# Patient Record
Sex: MALE | Race: WHITE | Employment: OTHER | URBAN - METROPOLITAN AREA
[De-identification: names, ages, dates, MRNs, and addresses within clinical notes are randomized per-mention and may not be internally consistent; named-entity substitution may affect disease eponyms.]

---

## 2019-01-21 ENCOUNTER — ANCILLARY PROCEDURE (OUTPATIENT)
Dept: GENERAL RADIOLOGY | Facility: CLINIC | Age: 64
End: 2019-01-21
Payer: COMMERCIAL

## 2019-01-21 ENCOUNTER — OFFICE VISIT (OUTPATIENT)
Dept: ORTHOPEDICS | Facility: CLINIC | Age: 64
End: 2019-01-21
Payer: COMMERCIAL

## 2019-01-21 ENCOUNTER — ANCILLARY PROCEDURE (OUTPATIENT)
Dept: ULTRASOUND IMAGING | Facility: CLINIC | Age: 64
End: 2019-01-21
Payer: COMMERCIAL

## 2019-01-21 VITALS — BODY MASS INDEX: 29.33 KG/M2 | RESPIRATION RATE: 16 BRPM | WEIGHT: 198 LBS | HEIGHT: 69 IN

## 2019-01-21 DIAGNOSIS — R10.31 RIGHT GROIN PAIN: ICD-10-CM

## 2019-01-21 DIAGNOSIS — R10.31 RIGHT GROIN PAIN: Primary | ICD-10-CM

## 2019-01-21 RX ORDER — FAMOTIDINE 10 MG
10 TABLET ORAL
COMMUNITY
Start: 2010-07-12

## 2019-01-21 RX ORDER — IBUPROFEN 200 MG
TABLET ORAL
COMMUNITY
Start: 2009-06-09

## 2019-01-21 RX ORDER — DIPHENOXYLATE HYDROCHLORIDE AND ATROPINE SULFATE 2.5; .025 MG/1; MG/1
1 TABLET ORAL
COMMUNITY
Start: 2017-11-10

## 2019-01-21 ASSESSMENT — MIFFLIN-ST. JEOR: SCORE: 1683.5

## 2019-01-21 NOTE — PROGRESS NOTES
NEW PATIENT INTAKE QUESTIONNAIRE  SPORTS & ORTHOPEDIC WALK-IN 1/21/2019    Primary Care Physician: None     Where are the majority of your medical records? [facility]    Reason for Visit:    What part of your body is injured / painful?  right hip    What caused the injury /pain? No inciting event     How long ago did your injury occur or pain begin? 4 weeks    What are your most bothersome symptoms? Pain    How would you characterize your symptom? aching and sharp    What makes your symptoms better? Ice and Ibuprofen    What makes your symptoms worse? Playing sport LE weight lifting, standing     Have you been previously seen for this problem? No    Medical History:    Medical History: None    Have you had surgery on this body part before? None    Medications: None    Allergies: Yes:      Family History of Medical Problems: Dad-heart aortic valve    Previous Surgeries: Yes Tonsils 1960's    Social History:    Occupation: Retired    Handedness: Right    Exercise: 4-5 days/week    Review of Systems:    Have you recently had a a fever, chills, weight loss? No    Do you have any vision problems? No    Do you have any chest pain or edema? No    Do you have any shortness of breath or wheezing?  No    Do you have stomach problems? No    Do you have any numbness or focal weakness? No    Do you have diabetes? No    Do you have problems with bleeding or clotting? No    Do you have an rashes or other skin lesions? No    Communication:    How did you hear about us? Mailing: Postcard    Who else should know about this visit?

## 2019-01-21 NOTE — PROGRESS NOTES
"[facility]  Orthopedics  Andi Maria MD  2019     Name: Srinivasan Lee  MRN: [id number]  Age: 63 year old  : 1955  Referring provider: Referred Self     Chief Complaint: Right Anterior Hip/Groin Pain.     History of Present Illness:   Srinivasan Lee is a 63 year old male who presents today for evaluation of right anterior hip and groin pain beginning approximately four weeks ago without precipitating injury or trauma. The pain is intermittent and can be both sharp and achy in nature. He indicates it is worse with activity, specifically lower extremity weight lifting, as well as with standing. He has been using ice and ibuprofen at home for treatment which offers some relief. He additionally mentions he has noticed a small lump with surrounding swelling in the area of pain, expressing concern for a possible inguinal hernia. He otherwise denies associated numbness or tingling. He has not had any testicular pain or swelling.     Review of Systems:   A 10-point review of systems was obtained and is negative except for as noted in the HPI.     Please see health assessment form for complete ROS that has been reviewed by Andi Maria MD.     Medications:      famotidine (PEPCID) 10 MG tablet, Take 10 mg by mouth, Disp: , Rfl:      ibuprofen (ADVIL/MOTRIN) 200 MG tablet, , Disp: , Rfl:      Multiple Vitamin (MULTI-VITAMINS) TABS, Take 1 tablet by mouth, Disp: , Rfl:     Allergies:  Animal dander.   Mercury.   Prednisone.     Past Medical History:  The patient has no past medical history of diabetes mellitus.      Past Surgical History:  The patient does not have any pertinent past surgical history.     Social History:   Single. Retired. Right hand dominant. Non-smoker. Exercises 4-5 days per week.     Family History:  No past pertinent family history.     Physical Examination:  Resp. rate 16, height 1.753 m (5' 9\"), weight 89.8 kg (198 lb).  General: Normal appearance, in no obvious distress.  Skin: No " ecchymosis, erythema, warmth, induration, or obvious rash.  Neuro: No motor deficit, grossly normal coordination, normal muscle tone.   Musculoskeletal: Right Hip - Symmetric passive hip range of motion. Pain with internal rotation. Strength is symmetric and without pain. Mild fullness to palpation in the inguinal area on the right. No increased bulging with cough.     Imaging:   Radiographs of the right hip - 2 views (01/21/2019)  Mild to moderate degenerative changes of the hip with preserved joint space. Per Radiology.     I have independently reviewed the above imaging studies; the results were discussed with the patient.     Assessment:   63 year old male with right groin pain, patient does have some OA on exam and radiographs which could be contributing to his pain, but some of the historical features of his symptoms and his description of a bulge in the groin is concerning for inguinal hernia.      Plan:    - Ultrasound of the groin; I will contact him with results.   - Follow-up as indicated based on results. Which could include surgical referral for if hernia present. Or treatment options for OA of hip.     Andi Maria MD        Scribe Disclosure:   IEmily, am serving as a scribe to document services personally performed by Andi Maria MD at this visit, based upon the provider's statements to me. All documentation has been reviewed by the aforementioned provider prior to being entered into the official medical record.

## 2019-02-20 ENCOUNTER — MYC MEDICAL ADVICE (OUTPATIENT)
Dept: ORTHOPEDICS | Facility: CLINIC | Age: 64
End: 2019-02-20

## 2020-03-11 ENCOUNTER — HEALTH MAINTENANCE LETTER (OUTPATIENT)
Age: 65
End: 2020-03-11

## 2021-01-03 ENCOUNTER — HEALTH MAINTENANCE LETTER (OUTPATIENT)
Age: 66
End: 2021-01-03

## 2021-03-09 ENCOUNTER — TRANSFERRED RECORDS (OUTPATIENT)
Dept: HEALTH INFORMATION MANAGEMENT | Facility: CLINIC | Age: 66
End: 2021-03-09

## 2021-03-11 ENCOUNTER — TELEPHONE (OUTPATIENT)
Dept: DERMATOLOGY | Facility: CLINIC | Age: 66
End: 2021-03-11

## 2021-03-12 ENCOUNTER — TELEPHONE (OUTPATIENT)
Dept: DERMATOLOGY | Facility: CLINIC | Age: 66
End: 2021-03-12

## 2021-03-13 NOTE — TELEPHONE ENCOUNTER
FUTURE VISIT INFORMATION      FUTURE VISIT INFORMATION:    Date: 4.6.21    Time: 11:00    [place] [facility] [place]  REFERRAL INFORMATION:    Referring provider:  na    Referring providers clinic:  na    Reason for visit/diagnosis  allergies per pt    RECORDS REQUESTED FROM:       Clinic name Comments Records Status Imaging Status    No previous related records

## 2021-04-02 NOTE — PROGRESS NOTES
[facility] Dermato-allergology note      Allergy Problem List:    Specialty Problems     None          CC:   No chief complaint on file.      Encounter Date: Apr 6, 2021    History of Present Illness:  I have reviewed the existing informations with patient personally, in Epic and other sources including the nursing intake corresponding to this issue. Srinivasan Lee is a 65 year old male who presents to the consult  in person     Patient sent by Dr. Hidalgo [facility]  Patient had a treatment with Levulan and red light = Jan 5th 2h treatment on face and lips = for few days red skin  3.5 weeks later patient developed a very itchy rash around the lips and oral cavity mostly  Swelling resolved after about 1 day, but redness stayed for couple weeks with desquamation. Desonide cream improved the eczema and stopped around March 9th. Then rebound and re-used Desonide and since about 2 weeks no desonide and no recurrence    Stopped Desonide about     Used then Aquaphor advanced and straight Aloe  Has started to use an organic toothpaste from [employer]      Past Medical History:   There is no problem list on file for this patient.    No past medical history on file.    Allergy History:     Allergies   Allergen Reactions     Animal Dander Other (See Comments)     Congestion, sneezing     Mercury Other (See Comments)     Used in eye solution---itching eyes, redness     Prednisone      Other reaction(s): Tachycardia  Heart racing       Social History:  The patient is retired. Patient has the following hobbies or non-occupational exposure      reports that he has never smoked. He has never used smokeless tobacco.      Family History:  No family history on file.    Medications:  Current Outpatient Medications   Medication Sig Dispense Refill     famotidine (PEPCID) 10 MG tablet Take 10 mg by mouth       ibuprofen (ADVIL/MOTRIN) 200 MG tablet        Multiple Vitamin (MULTI-VITAMINS) TABS Take 1 tablet by mouth          Review of Systems:  -As per HPI  -Constitutional: The patient denies fatigue, fevers, chills, unintended weight loss, and night sweats.  -HEENT: Patient denies nonhealing oral sores.  -Skin: As above in HPI. No additional skin concerns.    Physical exam:  Vitals: There were no vitals taken for this visit.  GEN: This is a well developed, well-nourished male in no acute distress, in a pleasant mood.    Skin: Focused examination of the skin on face within the consultation was performed.   Perioral still some erythema, but no active dermatitis  As above in HPI. No additional skin concerns.  - upper respiratory tract: currently no obvious Rhinitis  - lungs: no signs for active and present Asthma/Wheezing or coughing  - eyes: currently no active conjunctivits  - GI system/digestion: currently no problems, no bloating or Diarrhoea      Allergy Tests:    Past Allergy Test    Future Allergy Test: 4/2/2021     Order for PATCH TESTS    [] Outpatient  [] Inpatient: [facility]..../ Bed ....      Skin Atopy (atopic dermatitis) [] Yes   [x] No as baby milk allergy  Contact allergies:   [x] Yes   [] No to eye drops in 2009  Urticaria/Angioedema  [] Yes   [x] No  Rhinitis/Sinusitis:   [] Yes   [x] No   [] seasonal [] perennial            Allergic Asthma:   [] Yes   [x] No  Pets :  [] Yes   [] No  Which? RC to cats  Food Allergy:   [] Yes   [x] No  Drug allergies:   [] Yes   [x] No  Hand eczema:   [] Yes   [x] No   Leading hand:   [] R   [] L       [] Ambidextrous                        Reason for tests (suspected allergy): acute dermatitis perioral  Known previous allergies: none    Standardized panels  [x] Standard panel (40 tests)  [x] Preservatives & Antimicrobials (31 tests)  [x] Emulsifiers & Additives (25 tests)   [x] Perfumes/Flavours & Plants (25 tests)  [] Hairdresser panel (12 tests)  [] Rubber Chemicals (22 tests)  [] Plastics (26 tests)  [] Colorants/Dyes/Food additives (20 tests)  [] Metals (implants/dental) (24  tests)  [] Local anaesthetics/NSAIDs (13 tests)  [x] Antibiotics & Antimycotics (14 tests)   [] Corticosteroids (15 tests)   [] Photopatch test (62 tests)   [] others: ...      [x] Patient's own products: organic toothpaste from [employer], Aquaphor,   Eucerin cleanser, [place] lip    DO NOT test if chemical or biological identity is unknown!     always ask from patient the product information and safety sheets (MSDS)   [x] Atopy screen prick test (Atopic predisposition): ...    [] Patient needs consultation with Allergy team (changes of tests may apply)  [x] Tests discussed with Allergy team (can have direct appointment for patch tests)       Impression/Plan:    # suspicion for allergic contact sensitization on lips/oral DDx organic tooth paste or lip balm   Unlikely reaction to the PDT     # atopic predisposition with:    RC to cats        Patient counseling:  Explained patch tests and has to bring then his own products      Follow-up: in Derm-Allergy clinic for patch tests    Referred by: Dr. Rocky Leonard    Thank you for the opportunity be involved in the care of this patient.     Staff: Madeline Duran RN    I spent a total of 33 minutes face to face with Srinivasan Lee during today s office visit. Over 50% of this time was spent counseling the patient and/or coordinating care.  Please see Assessment and Plan for details.

## 2021-04-06 ENCOUNTER — PRE VISIT (OUTPATIENT)
Dept: ALLERGY | Facility: CLINIC | Age: 66
End: 2021-04-06

## 2021-04-06 ENCOUNTER — OFFICE VISIT (OUTPATIENT)
Dept: ALLERGY | Facility: CLINIC | Age: 66
End: 2021-04-06
Payer: COMMERCIAL

## 2021-04-06 DIAGNOSIS — J30.89 SEASONAL AND PERENNIAL ALLERGIC RHINOCONJUNCTIVITIS: ICD-10-CM

## 2021-04-06 DIAGNOSIS — L23.89 ALLERGIC CONTACT DERMATITIS DUE TO OTHER AGENTS: Primary | ICD-10-CM

## 2021-04-06 DIAGNOSIS — H10.10 SEASONAL AND PERENNIAL ALLERGIC RHINOCONJUNCTIVITIS: ICD-10-CM

## 2021-04-06 DIAGNOSIS — J30.2 SEASONAL AND PERENNIAL ALLERGIC RHINOCONJUNCTIVITIS: ICD-10-CM

## 2021-04-06 PROCEDURE — 99203 OFFICE O/P NEW LOW 30 MIN: CPT | Performed by: DERMATOLOGY

## 2021-04-06 RX ORDER — EPINEPHRINE 0.3 MG/.3ML
INJECTION SUBCUTANEOUS
COMMUNITY
Start: 2021-03-09

## 2021-04-06 RX ORDER — DESONIDE 0.5 MG/G
CREAM TOPICAL
COMMUNITY
Start: 2021-02-22

## 2021-04-06 NOTE — LETTER
4/6/2021         RE: Srinivasan Lee  [address]  [place] [address]        Dear Colleague,    Thank you for referring your patient, Srinivasan Lee, to the [facility] [place]. Please see a copy of my visit note below.    [facility] Dermato-allergology note      Allergy Problem List:    Specialty Problems     None          CC:   No chief complaint on file.      Encounter Date: Apr 6, 2021    History of Present Illness:  I have reviewed the existing informations with patient personally, in Epic and other sources including the nursing intake corresponding to this issue. Srinivasan Lee is a 65 year old male who presents to the consult  in person     Patient sent by Dr. Hidalgo [facility]  Patient had a treatment with Levulan and red light = Jan 5th 2h treatment on face and lips = for few days red skin  3.5 weeks later patient developed a very itchy rash around the lips and oral cavity mostly  Swelling resolved after about 1 day, but redness stayed for couple weeks with desquamation. Desonide cream improved the eczema and stopped around March 9th. Then rebound and re-used Desonide and since about 2 weeks no desonide and no recurrence    Stopped Desonide about     Used then Aquaphor advanced and straight Aloe  Has started to use an organic toothpaste from [employer]      Past Medical History:   There is no problem list on file for this patient.    No past medical history on file.    Allergy History:     Allergies   Allergen Reactions     Animal Dander Other (See Comments)     Congestion, sneezing     Mercury Other (See Comments)     Used in eye solution---itching eyes, redness     Prednisone      Other reaction(s): Tachycardia  Heart racing       Social History:  The patient is retired. Patient has the following hobbies or non-occupational exposure      reports that he has never smoked. He has never used smokeless tobacco.      Family History:  No family  history on file.    Medications:  Current Outpatient Medications   Medication Sig Dispense Refill     famotidine (PEPCID) 10 MG tablet Take 10 mg by mouth       ibuprofen (ADVIL/MOTRIN) 200 MG tablet        Multiple Vitamin (MULTI-VITAMINS) TABS Take 1 tablet by mouth         Review of Systems:  -As per HPI  -Constitutional: The patient denies fatigue, fevers, chills, unintended weight loss, and night sweats.  -HEENT: Patient denies nonhealing oral sores.  -Skin: As above in HPI. No additional skin concerns.    Physical exam:  Vitals: There were no vitals taken for this visit.  GEN: This is a well developed, well-nourished male in no acute distress, in a pleasant mood.    Skin: Focused examination of the skin on face within the consultation was performed.   Perioral still some erythema, but no active dermatitis  As above in HPI. No additional skin concerns.  - upper respiratory tract: currently no obvious Rhinitis  - lungs: no signs for active and present Asthma/Wheezing or coughing  - eyes: currently no active conjunctivits  - GI system/digestion: currently no problems, no bloating or Diarrhoea      Allergy Tests:    Past Allergy Test    Future Allergy Test: 4/2/2021     Order for PATCH TESTS    [] Outpatient  [] Inpatient: [facility]..../ Bed ....      Skin Atopy (atopic dermatitis) [] Yes   [x] No as baby milk allergy  Contact allergies:   [x] Yes   [] No to eye drops in 2009  Urticaria/Angioedema  [] Yes   [x] No  Rhinitis/Sinusitis:   [] Yes   [x] No   [] seasonal [] perennial            Allergic Asthma:   [] Yes   [x] No  Pets :  [] Yes   [] No  Which? RC to cats  Food Allergy:   [] Yes   [x] No  Drug allergies:   [] Yes   [x] No  Hand eczema:   [] Yes   [x] No   Leading hand:   [] R   [] L       [] Ambidextrous                        Reason for tests (suspected allergy): acute dermatitis perioral  Known previous allergies: none    Standardized panels  [x] Standard panel (40 tests)  [x] Preservatives &  Antimicrobials (31 tests)  [x] Emulsifiers & Additives (25 tests)   [x] Perfumes/Flavours & Plants (25 tests)  [] Hairdresser panel (12 tests)  [] Rubber Chemicals (22 tests)  [] Plastics (26 tests)  [] Colorants/Dyes/Food additives (20 tests)  [] Metals (implants/dental) (24 tests)  [] Local anaesthetics/NSAIDs (13 tests)  [x] Antibiotics & Antimycotics (14 tests)   [] Corticosteroids (15 tests)   [] Photopatch test (62 tests)   [] others: ...      [x] Patient's own products: organic toothpaste from [employer], Aquaphor,   Eucerin cleanser, [place] lip    DO NOT test if chemical or biological identity is unknown!     always ask from patient the product information and safety sheets (MSDS)   [x] Atopy screen prick test (Atopic predisposition): ...    [] Patient needs consultation with Allergy team (changes of tests may apply)  [x] Tests discussed with Allergy team (can have direct appointment for patch tests)       Impression/Plan:    # suspicion for allergic contact sensitization on lips/oral DDx organic tooth paste or lip balm   Unlikely reaction to the PDT     # atopic predisposition with:    RC to cats        Patient counseling:  Explained patch tests and has to bring then his own products      Follow-up: in Derm-Allergy clinic for patch tests    Referred by: Dr. Rocky Leonard    Thank you for the opportunity be involved in the care of this patient.     Staff: Madeline Duran RN    I spent a total of 33 minutes face to face with Srinivasan Lee during today s office visit. Over 50% of this time was spent counseling the patient and/or coordinating care.  Please see Assessment and Plan for details.        Again, thank you for allowing me to participate in the care of your patient.        Sincerely,        Rashard Young MD

## 2021-06-20 NOTE — PROGRESS NOTES
[facility] Dermato-allergology Note  Office visit  Encounter Date: Jun 21, 2021  ____________________________________________    CC: No chief complaint on file.      HPI:  Mr. Srinivasan Lee is a(n) 66 year old male who presents today as a return patient for allergy consultation  - Follow-up: in Derm-Allergy clinic for patch tests  - otherwise feeling well in usual state of health    Physical exam:  General: In no acute distress, well-developed, well-nourished  Eyes: no conjunctivitis  ENT: no signs of rhinitis   Pulmonary: no wheezing or coughing  Skin: Focused examination of the skin on test sites was performed = see test results below  No active eczematous skin lesions on tests sites, particularly back    Earlier History and Allergy exams:    Patient sent by Dr. Hidalgo, [facility]  Patient had a treatment with Levulan and red light = Jan 5th 2h treatment on face and lips = for few days red skin  3.5 weeks later patient developed a very itchy rash around the lips and oral cavity mostly  Swelling resolved after about 1 day, but redness stayed for couple weeks with desquamation. Desonide cream improved the eczema and stopped around March 9th. Then rebound and re-used Desonide and since about 2 weeks no desonide and no recurrence    Stopped Desonide about     Used then Aquaphor advanced and straight Aloe  Has started to use an organic toothpaste from [employer]      Past Medical History:   There is no problem list on file for this patient.    No past medical history on file.    Allergy History:     Allergies   Allergen Reactions     Animal Dander Other (See Comments)     Congestion, sneezing     Mercury Other (See Comments)     Used in eye solution---itching eyes, redness     Prednisone      Other reaction(s): Tachycardia  Heart racing       Social History:  The patient is retired. Patient has the following hobbies or non-occupational exposure      reports that he has never smoked. He has never used  smokeless tobacco.      Family History:  No family history on file.    Medications:  Current Outpatient Medications   Medication Sig Dispense Refill     desonide (DESOWEN) 0.05 % external cream APPLY THIN LAYER TO AFFECTED AREAS ON FACE TWICE DAILY X2 WEEKS, THEN DISCONTINUE       EPINEPHrine (ANY BX GENERIC EQUIV) 0.3 MG/0.3ML injection 2-pack INJECT INTO THIGH MUSCLE AS NEEDED FOR ANAPHYLAXIS.       famotidine (PEPCID) 10 MG tablet Take 10 mg by mouth       ibuprofen (ADVIL/MOTRIN) 200 MG tablet        Multiple Vitamin (MULTI-VITAMINS) TABS Take 1 tablet by mouth         Allergy Tests:    Past Allergy Test    Future Allergy Test: 4/2/2021     Order for PATCH TESTS    [] Outpatient  [] Inpatient: [facility]..../ Bed ....      Skin Atopy (atopic dermatitis) [] Yes   [x] No as baby milk allergy  Contact allergies:   [x] Yes   [] No to eye drops in 2009  Urticaria/Angioedema  [] Yes   [x] No  Rhinitis/Sinusitis:   [] Yes   [x] No   [] seasonal [] perennial            Allergic Asthma:   [] Yes   [x] No  Pets :  [] Yes   [] No  Which? RC to cats  Food Allergy:   [] Yes   [x] No  Drug allergies:   [] Yes   [x] No  Hand eczema:   [] Yes   [x] No   Leading hand:   [] R   [] L       [] Ambidextrous                      Reason for tests (suspected allergy): acute dermatitis perioral  Known previous allergies: none  Standardized panels  [x] Standard panel (40 tests)  [x] Preservatives & Antimicrobials (31 tests)  [x] Emulsifiers & Additives (25 tests)   [x] Perfumes/Flavours & Plants (25 tests)  [] Hairdresser panel (12 tests)  [] Rubber Chemicals (22 tests)  [] Plastics (26 tests)  [] Colorants/Dyes/Food additives (20 tests)  [] Metals (implants/dental) (24 tests)  [] Local anaesthetics/NSAIDs (13 tests)  [x] Antibiotics & Antimycotics (14 tests)   [] Corticosteroids (15 tests)   [] Photopatch test (62 tests)   [] others: ...      [x] Patient's own products: organic toothpaste from [employer], Aquaphor, Eucerin cleanser, [place]  lip    DO NOT test if chemical or biological identity is unknown!     always ask from patient the product information and safety sheets (MSDS)   [x] Atopy screen prick test (Atopic predisposition): ...    RESULTS & EVALUATION of PATCH TESTS    Patch test readings after     [x] 2 days, [] 3 days [x] 4 days, [] 5 days,    Jun 21, 2021 application of patch tests with results:    STANDARD Series        No Substance 2 days 4 days remarks   1 1 Aashish Mix [C] - -     2 Colophony - -     3  2-Mercaptobenzothiazole  - -      4 Methylisothiazolinone - -    5 5 Carba Mix - -     6 Thiuram Mix [A] - -     7 Bisphenol A Epoxy Resin - -     8 U-Ucuw-Rfkkyasyajl-Formaldehyde Resin - -     9 Mercapto Mix [A] - -    10 10 Black Rubber Mix- PPD [B] - -     11 Potassium Dichromate  -  -     12 Balsam of [place] (Myroxylon Pereirae Resin) - -     13 Nickel Sulphate Hexahydrate - -     14 Mixed Dialkyl Thiourea - -    15 15 Paraben Mix [B] - -     16 Methyldibromo Glutaronitrile - -     17 Fragrance Mix - -     18 2-Bromo-2-Nitropropane-1,3-Diol (Bronopol) NA NA     19 Lyral - -    20 20 Tixocortol-21- Pivalate - -     21 Diazolidiyl Urea (Germall II) - -     22 Methyl Methacrylate NA NA     23 Cobalt (II) Chloride Hexahydrate - -     24 Fragrance Mix II  - -    25 25 Compositae Mix - -     26 Benzoyl Peroxide - -     27 Bacitracin - -     28 Formaldehyde - -     29 Methylchloroisothiazolinone / Methylisothiazolinone - -    30 30 Corticosteroid Mix - -     31 Sodium Lauryl Sulfate - -     32 Lanolin Alcohol - -     33 Turpentine - -     34 Cetylstearylalcohol - -    35 35 Chlorhexidine Dicluconate - -     36 Budenoside - -     37 Imidazolidinyl Urea  - -     38 Ethyl-2 Cyanoacrylate - -     39 Quaternium 15 (Dowicil 200) - -    40 40 Decyl Glucoside - -    PRESERVATIVES & ANTIMICROBIALS        No Substance 2 days 4 days remarks   41 1  1,2-Benzisothiazoline-3-One, Sodium Salt - -     2  1,3,5-Cy (2-Hydroxyethyl) - Hexahydrotriazine  (Grotan BK) - -     3 9-Tofcebaesqpsw-8-Nitro-1, 3-Propanediol - -     4  3, 4, 4' - Triclocarban - -    45 5 4 - Chloro - 3 - Cresol - -     6 4 - Chloro - 4 - Xylenol (PCMX) - -     7 7-Ethylbicyclooxazolidine (Bioban XX5189) - -     8 Benzalkonium Chloride - -     9 Benzyl Alcohol - -    50 10 Cetalkonium Chloride - -     11 Cetylpyrimidine Chloride  - -     12 Chloroacetamide - -     13 DMDM Hydantoin - -     14 Glutaraldehyde - -    55 15 Triclosan - -     16 Glyoxal Trimeric Dihydrate - -     17 Iodopropynyl Butylcarbamate - -     18 Octylisothiazoline - -     19 Iodoform NA NA    60 20 (Nitrobutyl) Morpholine/(Ethylnitro-Trimethylene) Dimorpholine (Bioban P 1487) - -     21 Phenoxyethanol - -     22 Phenyl Salicylate - -     23 Povidone Iodine - -     24 Sodium Benzoate - -    65 25 Sodium Disulfite - -     26 Sorbic Acid - -     27 Thimerosal       28 Melamine Formaldehyde Resin       29 Ethylenediamine Dihydrochloride        Parabens      70 30 Butyl-P-Hydroxybenzoate - -     31 Ethyl-P-Hydroxybenzoate - -     32 Methyl-P-Hydroxybenzoate - -    73 33 Propyl-P-Hydroxybenzoate - -    EMULSIFIERS & ADDITIVES       No Substance 2 days 4 days remarks   74 1 Polyethylene Glycol-400 - -    75 2 Cocamidopropyl Betaine NA NA     3 Amerchol L101 - -     4 Propylene Glycol - -     5 Triethanolamine - -     6 Sorbitane Sesquiolate NA NA    80 7 Isopropylmyristate - -     8 Polysorbate 80  - -     9 Amidoamine   (Stearamidopropyl Dimethylamine) - -     10 Oleamidopropyl Dimethylamine NA NA     11 Lauryl Glucoside - -    85 12 Coconut Diethanolamide  - -     13 2-Hydroxy-4-Methoxy Benzophenone (Oxybenzone) - -     14 Benzophenone-4 (Sulisobenzon) - -     15 Propolis - -     16 Dexpanthenol - -    90 17 Carboxymethyl Cellulose Sodium NA NA     18 Abitol - -     19 Tert-Butylhydroquinone - -     20 Benzyl Salicylate - -      Antioxidant       21 Dodecyl Gallate - -    95 22 Butylhydroxyanisole (BHA) - -     23  Butylhydroxytoluene (BHT) - -     24 Di-Alpha-Tocopherol (Vit E) - -     25 Propyl Gallate - -     26 Zinc Pyrithione NA NA    100 27 Dimethylaminopropylamin (DMAPA) NA NA    COLORANTS, DYES, FOOD ADDITIVES   No Substance 2 days 4 days remarks   101 1 Aniline - -     2 Lloyd Brown R - -     3 Textile Dye Mix [A] - -     4 [place]  - -     5 Disperse Blue-3 - -     6 Disperse Orange-3 - -     7 Disperse Red-11 - -     8 Disperse Yellow-9 - -     9 Erythrosine-B - -    110 10 Naphthol AS - -       Food additives       11 Aspartame - -     12 Azorubine - -     13 Brilliant Black - -     14 Cochineal Red - -     15 Patent Blue-VF - -     16 Pectin - -     17 Quinoline Yellow - -     18 Saccharin - -     19 Tartrazine - -    120 20 Sodium Glutamate - -    PERFUMES, FLAVORS & PLANTS (added 06/22/2021)          No Substance 2 days 4 days remarks   121 1 Benzyl Cinnamate - -     2 Di-Limonene (Dipentene) - -     3 Cananga Odorata (Ruby Beltran) (I) - -     4 Lichen Acid Mix - -    125 5 Mentha Piperita Oil (Peppermint Oil) - -     6 Sesquiterpenelactone mix - -     7 Tea Tree Oil, Oxidized - -     8 Wood Tar Mix - -     9 Abietic Acid - -    130 10 Lavendula Angustifolia Oil (Lavender Oil) - -     11 Camphor  - -      Fragrance Mix I       12 [place] Absolute - -     13 Eugenol - -     14 Geraniol - -    135 15 Hydroxycitronellal - -     16 Isoeugenol - -     17 Cinnamic Aldehyde - -     18 Cinnamic Alcohol  - -      Fragrance mix II       19 Citronellol - -    140 20 Alpha-Hexylcinnamic Aldehyde    - -     21 Citral - -     22 Farnesol - -    143 23 Coumarin - -      OTHER PRODUCTS 06/23/2021        No Substance Conc  % solv 2 days 4 days remarks    1 Cheese P         2 Cheese 0         3          4          5          6          7          8          9          10           Patients own products:  è DO NOT test if chemical or biological identity is unknown!   - always ask from patient the product information and safety  "sheets and consult with the physician   - check neutral pH with pH indicator paper (do not test pH below 5 or over 8 or consult with physician)  - leave-on cosmetics can be tested \"as is\"  - rinse-off products have to be diluted with water, buffer, olive oil or paraffin (discuss with physician)  à remember:   - non-specific toxic/corrosive or biological reactions can occur  - tests with patients own products are not standardized and test conditions are not optimized for patch tests. Whenever possible test with standardized test series and correlate use of product with the result of the patch tests  - if not sure if compounds can be tested under occlusion in patch tests consider open application tests    Results of patch tests:                         Interpretation:  - Negative                    A    = Allergic      (+) Erythema    TI   = Toxic/irritant   + E + Infiltration    RaP = Relevance at Present     ++ E/I + Papulovesicle   Rpr  = Relevance Previously     +++ E/I/P + Blister     nR   = No Relevance    Atopy Screen (Placed Jun 21, 2021)    No Substance Readings (15 min) Evaluation   POS Histamine 1mg/ml +    NEG NaCl 0.9% -      No Substance Readings (15 min) Evaluation   1 Alternaria alternata (tenuis)  -    2 Cladosporium herbarum -    3 Aspergillus fumigatus -    4 Penicillium notatum -    5 Dermatophagoides pteronyssinus -    6 Dermatophagoides farinae -    7 Dog epithelium (canis spp) -    8 Cat hair (alessandro catus) -    9 Cockroach   (Blatella americana & germanica) -    10 Grass mix midwest   (Yamileth, Orchard, Redtop, Tariq) -    11 Abhay grass (sorghum halepense) -    12 Weed mix   (common Cocklebur, Lamb s quarters, rough redroot Pigweed, Dock/Sorrel) -    13 Mug wort (artemisia vulgare) -    14 Ragweed giant/short (ambrosia spp) -    15 White birch (Betula papyrifera) -    16 Tree mix 1 (Pecan, Maple BHR, Oak RVW, american [place], black [place]) -    17 Red cedar (juniperus virginia) -    18 Tree " mix 2   (white [place], river/red Birch, black [place], common [place], american Elm) -    19 Box elder/Maple mix (acer spp) -    20 Norman shagbark (carya ovata) -           Conclusion: no signs for atopy      Intradermal Testing (Placed Jun 21, 2021)    No Substance Conc.  Readings (15min) Evaluation   DF Standard Dust Mite - D. Farinae 1:10 +/++ 8mmP/10mmE   DP Standard Dust Mite - D. Pteronyssinus 1:10 (+) 6mmP/no E   A Aspergillus fumigatus  1:10 - neg   P Penicillium notatum 1:10 - neg   C Cat epithelium  1:10 - neg     1:10 -    Conclusion: slight immediate type reaction to the house dust mite D. Farinae, not to mold or cat. Any delayed reaction?    [] No relevant allergic reaction observed    [] Allergic reaction diagnosed against following allergens:      Interpretation/ remarks:   See later    [] Patient information given   [] ACDS CAMP information's (# ....) to following compounds: .....   [] General information's to following compounds: ......      Assessment & Plan:    ==> Final Diagnosis:     # suspicion for allergic contact sensitization on lips/oral DDx organic tooth paste or lip balm   Unlikely reaction to the PDT   * chronic, active    # atopic predisposition with:    RC to cats  * chronic, active    These conclusions are made at the best of one's knowledge and belief based on the provided evidence such as patient's history and allergy test results and they can change over time or can be incomplete because of missing information's.    ==> Treatment Plan:  See later    [facility]    Procedures Performed:  Allergy tests, including prick, intradermal and patch tests    Staff: : provider    Follow-up in Derm-Allergy clinic for 1st readings of patch tests after 2 days (virtual) and 2nd readings and final conclusions after 4 days (in person)   ___________________________    I spent a total of 32 minutes with Srinivasan Lee during today s  visit. This time was spent discussing all the  individual test results, correlating them to the clinical relevance, counseling the patient and/or coordinating care and performing allergy tests

## 2021-06-21 ENCOUNTER — OFFICE VISIT (OUTPATIENT)
Dept: ALLERGY | Facility: CLINIC | Age: 66
End: 2021-06-21
Payer: COMMERCIAL

## 2021-06-21 DIAGNOSIS — L20.89 OTHER ATOPIC DERMATITIS: ICD-10-CM

## 2021-06-21 DIAGNOSIS — J30.2 SEASONAL AND PERENNIAL ALLERGIC RHINOCONJUNCTIVITIS: ICD-10-CM

## 2021-06-21 DIAGNOSIS — H10.10 SEASONAL AND PERENNIAL ALLERGIC RHINOCONJUNCTIVITIS: ICD-10-CM

## 2021-06-21 DIAGNOSIS — J30.89 SEASONAL AND PERENNIAL ALLERGIC RHINOCONJUNCTIVITIS: ICD-10-CM

## 2021-06-21 DIAGNOSIS — L23.89 ALLERGIC CONTACT DERMATITIS DUE TO OTHER AGENTS: Primary | ICD-10-CM

## 2021-06-21 PROCEDURE — 95024 IQ TESTS W/ALLERGENIC XTRCS: CPT | Performed by: DERMATOLOGY

## 2021-06-21 PROCEDURE — 95004 PERQ TESTS W/ALRGNC XTRCS: CPT | Performed by: DERMATOLOGY

## 2021-06-21 PROCEDURE — 95044 PATCH/APPLICATION TESTS: CPT | Mod: 59 | Performed by: DERMATOLOGY

## 2021-06-21 NOTE — LETTER
6/21/2021         RE: Srinivasan Lee  [address]  [place] [facility] [place] [facility] [address]        Dear Colleague,    Thank you for referring your patient, Srinivasan Lee, to the [facility] [place]. Please see a copy of my visit note below.    [facility] Dermato-allergology Note  Office visit  Encounter Date: Jun 21, 2021  ____________________________________________    CC: No chief complaint on file.      HPI:  Mr. Srinivasan Lee is a(n) 66 year old male who presents today as a return patient for allergy consultation  - Follow-up: in Derm-Allergy clinic for patch tests  - otherwise feeling well in usual state of health    Physical exam:  General: In no acute distress, well-developed, well-nourished  Eyes: no conjunctivitis  ENT: no signs of rhinitis   Pulmonary: no wheezing or coughing  Skin: Focused examination of the skin on test sites was performed = see test results below  No active eczematous skin lesions on tests sites, particularly back    Earlier History and Allergy exams:    Patient sent by Dr. Hidalgo, [facility]  Patient had a treatment with Levulan and red light = Jan 5th 2h treatment on face and lips = for few days red skin  3.5 weeks later patient developed a very itchy rash around the lips and oral cavity mostly  Swelling resolved after about 1 day, but redness stayed for couple weeks with desquamation. Desonide cream improved the eczema and stopped around March 9th. Then rebound and re-used Desonide and since about 2 weeks no desonide and no recurrence    Stopped Desonide about     Used then Aquaphor advanced and straight Aloe  Has started to use an organic toothpaste from [employer]      Past Medical History:   There is no problem list on file for this patient.    No past medical history on file.    Allergy History:     Allergies   Allergen Reactions     Animal Dander Other (See Comments)     Congestion, sneezing     Mercury Other (See Comments)     Used in  eye solution---itching eyes, redness     Prednisone      Other reaction(s): Tachycardia  Heart racing       Social History:  The patient is retired. Patient has the following hobbies or non-occupational exposure      reports that he has never smoked. He has never used smokeless tobacco.      Family History:  No family history on file.    Medications:  Current Outpatient Medications   Medication Sig Dispense Refill     desonide (DESOWEN) 0.05 % external cream APPLY THIN LAYER TO AFFECTED AREAS ON FACE TWICE DAILY X2 WEEKS, THEN DISCONTINUE       EPINEPHrine (ANY BX GENERIC EQUIV) 0.3 MG/0.3ML injection 2-pack INJECT INTO THIGH MUSCLE AS NEEDED FOR ANAPHYLAXIS.       famotidine (PEPCID) 10 MG tablet Take 10 mg by mouth       ibuprofen (ADVIL/MOTRIN) 200 MG tablet        Multiple Vitamin (MULTI-VITAMINS) TABS Take 1 tablet by mouth         Allergy Tests:    Past Allergy Test    Future Allergy Test: 4/2/2021     Order for PATCH TESTS    [] Outpatient  [] Inpatient: [facility]..../ Bed ....      Skin Atopy (atopic dermatitis) [] Yes   [x] No as baby milk allergy  Contact allergies:   [x] Yes   [] No to eye drops in 2009  Urticaria/Angioedema  [] Yes   [x] No  Rhinitis/Sinusitis:   [] Yes   [x] No   [] seasonal [] perennial            Allergic Asthma:   [] Yes   [x] No  Pets :  [] Yes   [] No  Which? RC to cats  Food Allergy:   [] Yes   [x] No  Drug allergies:   [] Yes   [x] No  Hand eczema:   [] Yes   [x] No   Leading hand:   [] R   [] L       [] Ambidextrous                      Reason for tests (suspected allergy): acute dermatitis perioral  Known previous allergies: none  Standardized panels  [x] Standard panel (40 tests)  [x] Preservatives & Antimicrobials (31 tests)  [x] Emulsifiers & Additives (25 tests)   [x] Perfumes/Flavours & Plants (25 tests)  [] Hairdresser panel (12 tests)  [] Rubber Chemicals (22 tests)  [] Plastics (26 tests)  [] Colorants/Dyes/Food additives (20 tests)  [] Metals (implants/dental) (24  tests)  [] Local anaesthetics/NSAIDs (13 tests)  [x] Antibiotics & Antimycotics (14 tests)   [] Corticosteroids (15 tests)   [] Photopatch test (62 tests)   [] others: ...      [x] Patient's own products: organic toothpaste from [employer] foods, Aquaphor, Eucerin cleanser, Amos lip    DO NOT test if chemical or biological identity is unknown!     always ask from patient the product information and safety sheets (MSDS)   [x] Atopy screen prick test (Atopic predisposition): ...    RESULTS & EVALUATION of PATCH TESTS    Patch test readings after     [x] 2 days, [] 3 days [x] 4 days, [] 5 days,    Jun 21, 2021 application of patch tests with results:    STANDARD Series        No Substance 2 days 4 days remarks   1 1 Aashish Mix [C] - -     2 Colophony - -     3  2-Mercaptobenzothiazole  - -      4 Methylisothiazolinone - -    5 5 Carba Mix - -     6 Thiuram Mix [A] - -     7 Bisphenol A Epoxy Resin - -     8 Y-Qhkg-Rskddmfmpxq-Formaldehyde Resin - -     9 Mercapto Mix [A] - -    10 10 Black Rubber Mix- PPD [B] - -     11 Potassium Dichromate  -  -     12 Balsam of [place] (Myroxylon Pereirae Resin) - -     13 Nickel Sulphate Hexahydrate - -     14 Mixed Dialkyl Thiourea - -    15 15 Paraben Mix [B] - -     16 Methyldibromo Glutaronitrile - -     17 Fragrance Mix - -     18 2-Bromo-2-Nitropropane-1,3-Diol (Bronopol) NA NA     19 Lyral - -    20 20 Tixocortol-21- Pivalate - -     21 Diazolidiyl Urea (Germall II) - -     22 Methyl Methacrylate NA NA     23 Cobalt (II) Chloride Hexahydrate - -     24 Fragrance Mix II  - -    25 25 Compositae Mix - -     26 Benzoyl Peroxide - -     27 Bacitracin - -     28 Formaldehyde - -     29 Methylchloroisothiazolinone / Methylisothiazolinone - -    30 30 Corticosteroid Mix - -     31 Sodium Lauryl Sulfate - -     32 Lanolin Alcohol - -     33 Turpentine - -     34 Cetylstearylalcohol - -    35 35 Chlorhexidine Dicluconate - -     36 Budenoside - -     37 Imidazolidinyl Urea  - -     38 Ethyl-2  Cyanoacrylate - -     39 Quaternium 15 (Dowicil 200) - -    40 40 Decyl Glucoside - -    PRESERVATIVES & ANTIMICROBIALS        No Substance 2 days 4 days remarks   41 1  1,2-Benzisothiazoline-3-One, Sodium Salt - -     2  1,3,5-Cy (2-Hydroxyethyl) - Hexahydrotriazine (Grotan BK) - -     3 9-Wvcadieivhlru-1-Nitro-1, 3-Propanediol - -     4  3, 4, 4' - Triclocarban - -    45 5 4 - Chloro - 3 - Cresol - -     6 4 - Chloro - 4 - Xylenol (PCMX) - -     7 7-Ethylbicyclooxazolidine (Bioban [id number]) - -     8 Benzalkonium Chloride - -     9 Benzyl Alcohol - -    50 10 Cetalkonium Chloride - -     11 Cetylpyrimidine Chloride  - -     12 Chloroacetamide - -     13 DMDM Hydantoin - -     14 Glutaraldehyde - -    55 15 Triclosan - -     16 Glyoxal Trimeric Dihydrate - -     17 Iodopropynyl Butylcarbamate - -     18 Octylisothiazoline - -     19 Iodoform NA NA    60 20 (Nitrobutyl) Morpholine/(Ethylnitro-Trimethylene) Dimorpholine (Bioban P 1487) - -     21 Phenoxyethanol - -     22 Phenyl Salicylate - -     23 Povidone Iodine - -     24 Sodium Benzoate - -    65 25 Sodium Disulfite - -     26 Sorbic Acid - -     27 Thimerosal       28 Melamine Formaldehyde Resin       29 Ethylenediamine Dihydrochloride        Parabens      70 30 Butyl-P-Hydroxybenzoate - -     31 Ethyl-P-Hydroxybenzoate - -     32 Methyl-P-Hydroxybenzoate - -    73 33 Propyl-P-Hydroxybenzoate - -    EMULSIFIERS & ADDITIVES       No Substance 2 days 4 days remarks   74 1 Polyethylene Glycol-400 - -    75 2 Cocamidopropyl Betaine NA NA     3 Amerchol L101 - -     4 Propylene Glycol - -     5 Triethanolamine - -     6 Sorbitane Sesquiolate NA NA    80 7 Isopropylmyristate - -     8 Polysorbate 80  - -     9 Amidoamine   (Stearamidopropyl Dimethylamine) - -     10 Oleamidopropyl Dimethylamine NA NA     11 Lauryl Glucoside - -    85 12 Coconut Diethanolamide  - -     13 2-Hydroxy-4-Methoxy Benzophenone (Oxybenzone) - -     14 Benzophenone-4 (Sulisobenzon) - -      15 Propolis - -     16 Dexpanthenol - -    90 17 Carboxymethyl Cellulose Sodium NA NA     18 Abitol - -     19 Tert-Butylhydroquinone - -     20 Benzyl Salicylate - -      Antioxidant       21 Dodecyl Gallate - -    95 22 Butylhydroxyanisole (BHA) - -     23 Butylhydroxytoluene (BHT) - -     24 Di-Alpha-Tocopherol (Vit E) - -     25 Propyl Gallate - -     26 Zinc Pyrithione NA NA    100 27 Dimethylaminopropylamin (DMAPA) NA NA    PERFUMES, FLAVORS & PLANTS        No Substance 2 days 4 days remarks   101 1 Benzyl Cinnamate - -     2 Di-Limonene (Dipentene) - -     3 Cananga Odorata (Ruby Beltran) (I) - -     4 Lichen Acid Mix - -    105 5 Mentha Piperita Oil (Peppermint Oil) - -     6 Sesquiterpenelactone mix - -     7 Tea Tree Oil, Oxidized - -     8 Wood Tar Mix - -     9 Abietic Acid - -    110 10 Lavendula Angustifolia Oil (Lavender Oil) - -     11 Camphor  - -      Fragrance Mix I       12 Oakmoss Absolute - -     13 Eugenol - -     14 Geraniol - -    115 15 Hydroxycitronellal - -     16 Isoeugenol - -     17 Cinnamic Aldehyde - -     18 Cinnamic Alcohol  - -      Fragrance mix II       19 Citronellol - -    120 20 Alpha-Hexylcinnamic Aldehyde    - -     21 Citral - -     22 Farnesol - -    123 23 Coumarin - -    ANTIBIOTICS & ANTIMYCOTICS    No Substance 2 days 4 days remarks   124 1 Erythromycine - -    125 2 Framycetine Sulphate - -     3 Fusidic Acid Sodium Salt - -     4 Gentamicin Sulphate - -     5 Neomycine Sulphate - -     6 Oxytetracycline  - -    130 7 Polymyxin B Sulphate - -     8 Tetracycline-HCL - -     9 Sulfanilamide - -     10 Metronidazole - -     11 Oxyquinoline Mix - -    135 12 Nitrofurazone - -     13 Nystatin - -    137 14 Clotrimazole - -    OTHER PRODUCTS 06/23/2021        No Substance Conc  % solv 2 days 4 days remarks   138 1 Toothpaste whole foods        139 2 Truro lip balm        140 3 Eucerin cleanser        141 4 Aquaphor         5          6          7          8          9        "   10           Patients own products:  è DO NOT test if chemical or biological identity is unknown!   - always ask from patient the product information and safety sheets and consult with the physician   - check neutral pH with pH indicator paper (do not test pH below 5 or over 8 or consult with physician)  - leave-on cosmetics can be tested \"as is\"  - rinse-off products have to be diluted with water, buffer, olive oil or paraffin (discuss with physician)  à remember:   - non-specific toxic/corrosive or biological reactions can occur  - tests with patients own products are not standardized and test conditions are not optimized for patch tests. Whenever possible test with standardized test series and correlate use of product with the result of the patch tests  - if not sure if compounds can be tested under occlusion in patch tests consider open application tests    Results of patch tests:                         Interpretation:  - Negative                    A    = Allergic      (+) Erythema    TI   = Toxic/irritant   + E + Infiltration    RaP = Relevance at Present     ++ E/I + Papulovesicle   Rpr  = Relevance Previously     +++ E/I/P + Blister     nR   = No Relevance    Atopy Screen (Placed Jun 21, 2021)    No Substance Readings (15 min) Evaluation   POS Histamine 1mg/ml +    NEG NaCl 0.9% -      No Substance Readings (15 min) Evaluation   1 Alternaria alternata (tenuis)  -    2 Cladosporium herbarum -    3 Aspergillus fumigatus -    4 Penicillium notatum -    5 Dermatophagoides pteronyssinus -    6 Dermatophagoides farinae -    7 Dog epithelium (canis spp) -    8 Cat hair (alessandro catus) -    9 Cockroach   (Blatella americana & germanica) -    10 Grass mix midwest   (Yamileth, Orchard, Redtop, Tariq) -    11 Abhay grass (sorghum halepense) -    12 Weed mix   (common Cocklebur, Lamb s quarters, rough redroot Pigweed, Dock/Sorrel) -    13 Mug wort (artemisia vulgare) -    14 Ragweed giant/short (ambrosia spp) -    15 " White birch (Betula papyrifera) -    16 Tree mix 1 (Pecan, Maple BHR, Oak RVW, american Alpine, black [place]) -    17 Red cedar (juniperus virginia) -    18 Tree mix 2   (white Alexander, river/red Birch, black [place], common Dum[place], american Elm) -    19 Box elder/Maple mix (acer spp) -    20 [place] shagbark (carya ovata) -           Conclusion: no signs for atopy      Intradermal Testing (Placed Jun 21, 2021)    No Substance Conc.  Readings (15min) Evaluation   DF Standard Dust Mite - D. Farinae 1:10 +/++ 8mmP/10mmE   DP Standard Dust Mite - D. Pteronyssinus 1:10 (+) 6mmP/no E   A Aspergillus fumigatus  1:10 - neg   P Penicillium notatum 1:10 - neg   C Cat epithelium  1:10 - neg     1:10 -    Conclusion: slight immediate type reaction to the house dust mite D. Farinae, not to mold or cat. Any delayed reaction?    [] No relevant allergic reaction observed    [] Allergic reaction diagnosed against following allergens:      Interpretation/ remarks:   See later    [] Patient information given   [] ACDS CAMP information's (# ....) to following compounds: .....   [] General information's to following compounds: ......      Assessment & Plan:    ==> Final Diagnosis:     # suspicion for allergic contact sensitization on lips/oral DDx organic tooth paste or lip balm   Unlikely reaction to the PDT   * chronic, active    # atopic predisposition with:    RC to cats  * chronic, active    These conclusions are made at the best of one's knowledge and belief based on the provided evidence such as patient's history and allergy test results and they can change over time or can be incomplete because of missing information's.    ==> Treatment Plan:  See later    Referred by: Dr. Rocky Leonard    Procedures Performed:  Allergy tests, including prick, intradermal and patch tests    Staff: : provider    Follow-up in Derm-Allergy clinic for 1st readings of patch tests after 2 days (virtual) and 2nd readings and final conclusions after 4  days (in person)   ___________________________    I spent a total of 32 minutes with Srinivasan Lee during today s  visit. This time was spent discussing all the individual test results, correlating them to the clinical relevance, counseling the patient and/or coordinating care and performing allergy tests             Again, thank you for allowing me to participate in the care of your patient.        Sincerely,        Rashard Young MD

## 2021-06-23 ENCOUNTER — OFFICE VISIT (OUTPATIENT)
Dept: ALLERGY | Facility: CLINIC | Age: 66
End: 2021-06-23
Payer: COMMERCIAL

## 2021-06-23 DIAGNOSIS — L23.89 ALLERGIC CONTACT DERMATITIS DUE TO OTHER AGENTS: Primary | ICD-10-CM

## 2021-06-23 DIAGNOSIS — L20.89 OTHER ATOPIC DERMATITIS: ICD-10-CM

## 2021-06-23 PROCEDURE — 99207 PR NO CHARGE LOS: CPT | Performed by: DERMATOLOGY

## 2021-06-23 ASSESSMENT — PAIN SCALES - GENERAL: PAINLEVEL: NO PAIN (0)

## 2021-06-23 NOTE — PROGRESS NOTES
[facility] Dermato-allergology Note  Office visit  Encounter Date: Jun 23, 2021  ____________________________________________    CC: No chief complaint on file.      HPI:  Mr. Srinivasan Lee is a(n) 66 year old male who presents today as a return patient for allergy consultation  - Follow-up in Derm-Allergy clinic for 1st readings of patch tests after 2 days   - otherwise feeling well in usual state of health    Physical exam:  General: In no acute distress, well-developed, well-nourished  Eyes: no conjunctivitis  ENT: no signs of rhinitis   Pulmonary: no wheezing or coughing  Skin:Focused examination of the skin on test sites was performed = see test results below    Earlier History and Allergy exams:    Patient sent by Dr. Hidalgo [facility]  Patient had a treatment with Levulan and red light = Jan 5th 2h treatment on face and lips = for few days red skin  3.5 weeks later patient developed a very itchy rash around the lips and oral cavity mostly  Swelling resolved after about 1 day, but redness stayed for couple weeks with desquamation. Desonide cream improved the eczema and stopped around March 9th. Then rebound and re-used Desonide and since about 2 weeks no desonide and no recurrence    Stopped Desonide about     Used then Aquaphor advanced and straight Aloe  Has started to use an organic toothpaste from [employer]      Past Medical History:   There is no problem list on file for this patient.    No past medical history on file.    Allergy History:     Allergies   Allergen Reactions     Animal Dander Other (See Comments)     Congestion, sneezing     Mercury Other (See Comments)     Used in eye solution---itching eyes, redness     Prednisone      Other reaction(s): Tachycardia  Heart racing       Social History:  The patient is retired. Patient has the following hobbies or non-occupational exposure      reports that he has never smoked. He has never used smokeless tobacco.      Family  History:  No family history on file.    Medications:  Current Outpatient Medications   Medication Sig Dispense Refill     desonide (DESOWEN) 0.05 % external cream APPLY THIN LAYER TO AFFECTED AREAS ON FACE TWICE DAILY X2 WEEKS, THEN DISCONTINUE       EPINEPHrine (ANY BX GENERIC EQUIV) 0.3 MG/0.3ML injection 2-pack INJECT INTO THIGH MUSCLE AS NEEDED FOR ANAPHYLAXIS.       famotidine (PEPCID) 10 MG tablet Take 10 mg by mouth       ibuprofen (ADVIL/MOTRIN) 200 MG tablet        Multiple Vitamin (MULTI-VITAMINS) TABS Take 1 tablet by mouth         Allergy Tests:    Past Allergy Test    Future Allergy Test: 4/2/2021     Order for PATCH TESTS    [] Outpatient  [] Inpatient: [facility]..../ Bed ....      Skin Atopy (atopic dermatitis) [] Yes   [x] No as baby milk allergy  Contact allergies:   [x] Yes   [] No to eye drops in 2009  Urticaria/Angioedema  [] Yes   [x] No  Rhinitis/Sinusitis:   [] Yes   [x] No   [] seasonal [] perennial            Allergic Asthma:   [] Yes   [x] No  Pets :  [] Yes   [] No  Which? RC to cats  Food Allergy:   [] Yes   [x] No  Drug allergies:   [] Yes   [x] No  Hand eczema:   [] Yes   [x] No   Leading hand:   [] R   [] L       [] Ambidextrous                      Reason for tests (suspected allergy): acute dermatitis perioral  Known previous allergies: none  Standardized panels  [x] Standard panel (40 tests)  [x] Preservatives & Antimicrobials (31 tests)  [x] Emulsifiers & Additives (25 tests)   [x] Perfumes/Flavours & Plants (25 tests)  [] Hairdresser panel (12 tests)  [] Rubber Chemicals (22 tests)  [] Plastics (26 tests)  [] Colorants/Dyes/Food additives (20 tests)  [] Metals (implants/dental) (24 tests)  [] Local anaesthetics/NSAIDs (13 tests)  [x] Antibiotics & Antimycotics (14 tests)   [] Corticosteroids (15 tests)   [] Photopatch test (62 tests)   [] others: ...      [x] Patient's own products: organic toothpaste from [employer], Aquaphor, Eucerin cleanser, [place] lip    DO NOT test if chemical or  biological identity is unknown!     always ask from patient the product information and safety sheets (MSDS)   [x] Atopy screen prick test (Atopic predisposition): ...    RESULTS & EVALUATION of PATCH TESTS    Patch test readings after     [x] 2 days, [] 3 days [x] 4 days, [] 5 days,    Jun 23, 2021 application of patch tests with results:    STANDARD Series        No Substance 2 days 4 days remarks   1 1 Aashish Mix [C] - -     2 Colophony - -     3  2-Mercaptobenzothiazole  - -      4 Methylisothiazolinone - -    5 5 Carba Mix - -     6 Thiuram Mix [A] - -     7 Bisphenol A Epoxy Resin - -     8 U-Jvcy-Rxdvlltdhdd-Formaldehyde Resin - -     9 Mercapto Mix [A] - -    10 10 Black Rubber Mix- PPD [B] - -     11 Potassium Dichromate  +  -     12 Balsam of [place] (Myroxylon Pereirae Resin) - -     13 Nickel Sulphate Hexahydrate - -     14 Mixed Dialkyl Thiourea - -    15 15 Paraben Mix [B] - -     16 Methyldibromo Glutaronitrile - -     17 Fragrance Mix - -     18 2-Bromo-2-Nitropropane-1,3-Diol (Bronopol) NA NA     19 Lyral - -    20 20 Tixocortol-21- Pivalate - -     21 Diazolidiyl Urea (Germall II) - -     22 Methyl Methacrylate NA NA     23 Cobalt (II) Chloride Hexahydrate - -     24 Fragrance Mix II  - -    25 25 Compositae Mix - -     26 Benzoyl Peroxide - -     27 Bacitracin - -     28 Formaldehyde - -     29 Methylchloroisothiazolinone / Methylisothiazolinone - -    30 30 Corticosteroid Mix - -     31 Sodium Lauryl Sulfate - -     32 Lanolin Alcohol - -     33 Turpentine - -     34 Cetylstearylalcohol - -    35 35 Chlorhexidine Dicluconate - -     36 Budenoside - -     37 Imidazolidinyl Urea  - -     38 Ethyl-2 Cyanoacrylate - -     39 Quaternium 15 (Dowicil 200) - -    40 40 Decyl Glucoside - -    PRESERVATIVES & ANTIMICROBIALS        No Substance 2 days 4 days remarks   41 1  1,2-Benzisothiazoline-3-One, Sodium Salt - -     2  1,3,5-Cy (2-Hydroxyethyl) - Hexahydrotriazine (Grotan BK) - -     3  0-Wsmmqndnbshfi-2-Nitro-1, 3-Propanediol - -     4  3, 4, 4' - Triclocarban - -    45 5 4 - Chloro - 3 - Cresol - -     6 4 - Chloro - 4 - Xylenol (PCMX) - -     7 7-Ethylbicyclooxazolidine (Bioban [id number]) - -     8 Benzalkonium Chloride - -     9 Benzyl Alcohol - -    50 10 Cetalkonium Chloride - -     11 Cetylpyrimidine Chloride  - -     12 Chloroacetamide - -     13 DMDM Hydantoin - -     14 Glutaraldehyde - -    55 15 Triclosan - -     16 Glyoxal Trimeric Dihydrate - -     17 Iodopropynyl Butylcarbamate - -     18 Octylisothiazoline - -     19 Iodoform NA NA    60 20 (Nitrobutyl) Morpholine/(Ethylnitro-Trimethylene) Dimorpholine (Onestop Internetan P 1487) - -     21 Phenoxyethanol - -     22 Phenyl Salicylate - -     23 Povidone Iodine - -     24 Sodium Benzoate - -    65 25 Sodium Disulfite - -     26 Sorbic Acid - -     27 Thimerosal       28 Melamine Formaldehyde Resin       29 Ethylenediamine Dihydrochloride        Parabens      70 30 Butyl-P-Hydroxybenzoate - -     31 Ethyl-P-Hydroxybenzoate - -     32 Methyl-P-Hydroxybenzoate - -    73 33 Propyl-P-Hydroxybenzoate - -    EMULSIFIERS & ADDITIVES       No Substance 2 days 4 days remarks   74 1 Polyethylene Glycol-400 - -    75 2 Cocamidopropyl Betaine NA NA     3 Amerchol L101 - -     4 Propylene Glycol - -     5 Triethanolamine - -     6 Sorbitane Sesquiolate NA NA    80 7 Isopropylmyristate - -     8 Polysorbate 80  - -     9 Amidoamine   (Stearamidopropyl Dimethylamine) - -     10 Oleamidopropyl Dimethylamine NA NA     11 Lauryl Glucoside - -    85 12 Coconut Diethanolamide  - -     13 2-Hydroxy-4-Methoxy Benzophenone (Oxybenzone) - -     14 Benzophenone-4 (Sulisobenzon) - -     15 Propolis - -     16 Dexpanthenol - -    90 17 Carboxymethyl Cellulose Sodium NA NA     18 Abitol - -     19 Tert-Butylhydroquinone - -     20 Benzyl Salicylate - -      Antioxidant       21 Dodecyl Gallate - -    95 22 Butylhydroxyanisole (BHA) - -     23 Butylhydroxytoluene (BHT) - -      24 Di-Alpha-Tocopherol (Vit E) - -     25 Propyl Gallate - -     26 Zinc Pyrithione NA NA    100 27 Dimethylaminopropylamin (DMAPA) NA NA    COLORANTS, DYES, FOOD ADDITIVES   No Substance 2 days 4 days remarks   101 1 Aniline - -     2 Lloyd Brown R - -     3 Textile Dye Mix [A] - -     4 Epsom  - -     5 Disperse Blue-3 - -     6 Disperse Orange-3 - -     7 Disperse Red-11 - -     8 Disperse Yellow-9 - -     9 Erythrosine-B - -    110 10 Naphthol AS - -       Food additives       11 Aspartame - -     12 Azorubine - -     13 Brilliant Black - -     14 Cochineal Red - -     15 Patent Blue-VF - -     16 Pectin - -     17 Quinoline Yellow - -     18 Saccharin - -     19 Tartrazine - -    120 20 Sodium Glutamate - -    PERFUMES, FLAVORS & PLANTS (added 06/22/2021)          No Substance 2 days 4 days remarks   121 1 Benzyl Cinnamate - -     2 Di-Limonene (Dipentene) - -     3 Cananga Odorata (Ruby Beltran) (I) - -     4 Lichen Acid Mix - -    125 5 Mentha Piperita Oil (Peppermint Oil) - -     6 Sesquiterpenelactone mix - -     7 Tea Tree Oil, Oxidized - -     8 Wood Tar Mix - -     9 Abietic Acid - -    130 10 Lavendula Angustifolia Oil (Lavender Oil) - -     11 Camphor  - -      Fragrance Mix I       12 Oakmoss Absolute - -     13 Eugenol - -     14 Geraniol - -    135 15 Hydroxycitronellal - -     16 Isoeugenol - -     17 Cinnamic Aldehyde - -     18 Cinnamic Alcohol  - -      Fragrance mix II       19 Citronellol - -    140 20 Alpha-Hexylcinnamic Aldehyde    - -     21 Citral - -     22 Farnesol - -    143 23 Coumarin - -      OTHER PRODUCTS 06/23/2021        No Substance Conc  % solv 2 days 4 days remarks    1 Cheese P         2 Cheese 0         3          4          5          6          7          8          9          10           Patients own products:  è DO NOT test if chemical or biological identity is unknown!   - always ask from patient the product information and safety sheets and consult with the  "physician   - check neutral pH with pH indicator paper (do not test pH below 5 or over 8 or consult with physician)  - leave-on cosmetics can be tested \"as is\"  - rinse-off products have to be diluted with water, buffer, olive oil or paraffin (discuss with physician)  à remember:   - non-specific toxic/corrosive or biological reactions can occur  - tests with patients own products are not standardized and test conditions are not optimized for patch tests. Whenever possible test with standardized test series and correlate use of product with the result of the patch tests  - if not sure if compounds can be tested under occlusion in patch tests consider open application tests    Results of patch tests:                         Interpretation:  - Negative                    A    = Allergic      (+) Erythema    TI   = Toxic/irritant   + E + Infiltration    RaP = Relevance at Present     ++ E/I + Papulovesicle   Rpr  = Relevance Previously     +++ E/I/P + Blister     nR   = No Relevance    Atopy Screen (Placed Jun 23, 2021)    No Substance Readings (15 min) Evaluation   POS Histamine 1mg/ml +    NEG NaCl 0.9% -      No Substance Readings (15 min) Evaluation   1 Alternaria alternata (tenuis)  -    2 Cladosporium herbarum -    3 Aspergillus fumigatus -    4 Penicillium notatum -    5 Dermatophagoides pteronyssinus -    6 Dermatophagoides farinae -    7 Dog epithelium (canis spp) -    8 Cat hair (alessandro catus) -    9 Cockroach   (Blatella americana & germanica) -    10 Grass mix midwest   (Yamileth, Orchard, Redtop, Tariq) -    11 Abhay grass (sorghum halepense) -    12 Weed mix   (common Cocklebur, Lamb s quarters, rough redroot Pigweed, Dock/Sorrel) -    13 Mug wort (artemisia vulgare) -    14 Ragweed giant/short (ambrosia spp) -    15 White birch (Betula papyrifera) -    16 Tree mix 1 (Pecan, Maple BHR, Oak RVW, american [place], black [place]) -    17 Red cedar (juniperus virginia) -    18 Tree mix 2   (white [place], " river/red Birch, black [place], common [place], american Elm) -    19 Box elder/Maple mix (acer spp) -    20 Morrison shagbark (carya ovata) -           Conclusion: no signs for atopy      Intradermal Testing (Placed Jun 23, 2021)    No Substance Conc.  Readings (15min) Evaluation   DF Standard Dust Mite - D. Farinae 1:10 +/++ 8mmP/10mmE   DP Standard Dust Mite - D. Pteronyssinus 1:10 (+) 6mmP/no E   A Aspergillus fumigatus  1:10 - neg   P Penicillium notatum 1:10 - neg   C Cat epithelium  1:10 - neg     1:10 -    Conclusion: slight immediate type reaction to the house dust mite D. Farinae, not to mold or cat. After 2 days no clear delayed reactions    [x] No relevant allergic reaction observed    [] Allergic reaction diagnosed against following allergens:      Interpretation/ remarks:   See later    [] Patient information given   [] ACDS CAMP information's (# ....) to following compounds: .....   [] General information's to following compounds: ......      Assessment & Plan:    ==> Final Diagnosis:     # suspicion for allergic contact sensitization on lips/oral DDx organic tooth paste or lip balm   Unlikely reaction to the PDT   * chronic, active    # atopic predisposition with:    RC to cats  * chronic, active    These conclusions are made at the best of one's knowledge and belief based on the provided evidence such as patient's history and allergy test results and they can change over time or can be incomplete because of missing information's.    ==> Treatment Plan:  See later    Referred by: Dr. Rocky Leonard    Follow-up in Derm-Allergy clinic for 2nd readings and final conclusions after 4 days (in person)     Staff: : provider    I spent a total of 18 minutes with Srinivasan Lee during today s  visit. This time was spent discussing all the individual test results, correlating them to the clinical relevance, counseling the patient and/or coordinating care

## 2021-06-23 NOTE — LETTER
6/23/2021         RE: Srinivasan Lee  [address]  [place] [address]        Dear Colleague,    Thank you for referring your patient, Srinivasan Lee, to the [facility] [place]. Please see a copy of my visit note below.    [facility] Dermato-allergology Note  Office visit  Encounter Date: Jun 23, 2021  ____________________________________________    CC: No chief complaint on file.      HPI:  Mr. Srinivasan Lee is a(n) 66 year old male who presents today as a return patient for allergy consultation  - Follow-up in Derm-Allergy clinic for 1st readings of patch tests after 2 days   - otherwise feeling well in usual state of health    Physical exam:  General: In no acute distress, well-developed, well-nourished  Eyes: no conjunctivitis  ENT: no signs of rhinitis   Pulmonary: no wheezing or coughing  Skin:Focused examination of the skin on test sites was performed = see test results below    Earlier History and Allergy exams:    Patient sent by Dr. Hidalgo [facility]  Patient had a treatment with Levulan and red light = Jan 5th 2h treatment on face and lips = for few days red skin  3.5 weeks later patient developed a very itchy rash around the lips and oral cavity mostly  Swelling resolved after about 1 day, but redness stayed for couple weeks with desquamation. Desonide cream improved the eczema and stopped around March 9th. Then rebound and re-used Desonide and since about 2 weeks no desonide and no recurrence    Stopped Desonide about     Used then Aquaphor advanced and straight Aloe  Has started to use an organic toothpaste from [employer]      Past Medical History:   There is no problem list on file for this patient.    No past medical history on file.    Allergy History:     Allergies   Allergen Reactions     Animal Dander Other (See Comments)     Congestion, sneezing     Mercury Other (See Comments)     Used in eye solution---itching eyes, redness      Prednisone      Other reaction(s): Tachycardia  Heart racing       Social History:  The patient is retired. Patient has the following hobbies or non-occupational exposure      reports that he has never smoked. He has never used smokeless tobacco.      Family History:  No family history on file.    Medications:  Current Outpatient Medications   Medication Sig Dispense Refill     desonide (DESOWEN) 0.05 % external cream APPLY THIN LAYER TO AFFECTED AREAS ON FACE TWICE DAILY X2 WEEKS, THEN DISCONTINUE       EPINEPHrine (ANY BX GENERIC EQUIV) 0.3 MG/0.3ML injection 2-pack INJECT INTO THIGH MUSCLE AS NEEDED FOR ANAPHYLAXIS.       famotidine (PEPCID) 10 MG tablet Take 10 mg by mouth       ibuprofen (ADVIL/MOTRIN) 200 MG tablet        Multiple Vitamin (MULTI-VITAMINS) TABS Take 1 tablet by mouth         Allergy Tests:    Past Allergy Test    Future Allergy Test: 4/2/2021     Order for PATCH TESTS    [] Outpatient  [] Inpatient: [facility]..../ Bed ....      Skin Atopy (atopic dermatitis) [] Yes   [x] No as baby milk allergy  Contact allergies:   [x] Yes   [] No to eye drops in 2009  Urticaria/Angioedema  [] Yes   [x] No  Rhinitis/Sinusitis:   [] Yes   [x] No   [] seasonal [] perennial            Allergic Asthma:   [] Yes   [x] No  Pets :  [] Yes   [] No  Which? RC to cats  Food Allergy:   [] Yes   [x] No  Drug allergies:   [] Yes   [x] No  Hand eczema:   [] Yes   [x] No   Leading hand:   [] R   [] L       [] Ambidextrous                      Reason for tests (suspected allergy): acute dermatitis perioral  Known previous allergies: none  Standardized panels  [x] Standard panel (40 tests)  [x] Preservatives & Antimicrobials (31 tests)  [x] Emulsifiers & Additives (25 tests)   [x] Perfumes/Flavours & Plants (25 tests)  [] Hairdresser panel (12 tests)  [] Rubber Chemicals (22 tests)  [] Plastics (26 tests)  [] Colorants/Dyes/Food additives (20 tests)  [] Metals (implants/dental) (24 tests)  [] Local anaesthetics/NSAIDs (13  tests)  [x] Antibiotics & Antimycotics (14 tests)   [] Corticosteroids (15 tests)   [] Photopatch test (62 tests)   [] others: ...      [x] Patient's own products: organic toothpaste from [employer], Aquaphor, Eucerin cleanser, Amos lip    DO NOT test if chemical or biological identity is unknown!     always ask from patient the product information and safety sheets (MSDS)   [x] Atopy screen prick test (Atopic predisposition): ...    RESULTS & EVALUATION of PATCH TESTS    Patch test readings after     [x] 2 days, [] 3 days [x] 4 days, [] 5 days,    Jun 23, 2021 application of patch tests with results:    STANDARD Series        No Substance 2 days 4 days remarks   1 1 Aashish Mix [C] - -     2 Colophony - -     3  2-Mercaptobenzothiazole  - -      4 Methylisothiazolinone - -    5 5 Carba Mix - -     6 Thiuram Mix [A] - -     7 Bisphenol A Epoxy Resin - -     8 R-Ojma-Zqsunruxalg-Formaldehyde Resin - -     9 Mercapto Mix [A] - -    10 10 Black Rubber Mix- PPD [B] - -     11 Potassium Dichromate  +  -     12 Balsam of [place] (Myroxylon Pereirae Resin) - -     13 Nickel Sulphate Hexahydrate - -     14 Mixed Dialkyl Thiourea - -    15 15 Paraben Mix [B] - -     16 Methyldibromo Glutaronitrile - -     17 Fragrance Mix - -     18 2-Bromo-2-Nitropropane-1,3-Diol (Bronopol) NA NA     19 Lyral - -    20 20 Tixocortol-21- Pivalate - -     21 Diazolidiyl Urea (Germall II) - -     22 Methyl Methacrylate NA NA     23 Cobalt (II) Chloride Hexahydrate - -     24 Fragrance Mix II  - -    25 25 Compositae Mix - -     26 Benzoyl Peroxide - -     27 Bacitracin - -     28 Formaldehyde - -     29 Methylchloroisothiazolinone / Methylisothiazolinone - -    30 30 Corticosteroid Mix - -     31 Sodium Lauryl Sulfate - -     32 Lanolin Alcohol - -     33 Turpentine - -     34 Cetylstearylalcohol - -    35 35 Chlorhexidine Dicluconate - -     36 Budenoside - -     37 Imidazolidinyl Urea  - -     38 Ethyl-2 Cyanoacrylate - -     39 Quaternium 15  (Dowicil 200) - -    40 40 Decyl Glucoside - -    PRESERVATIVES & ANTIMICROBIALS        No Substance 2 days 4 days remarks   41 1  1,2-Benzisothiazoline-3-One, Sodium Salt - -     2  1,3,5-Cy (2-Hydroxyethyl) - Hexahydrotriazine (Grotan BK) - -     3 4-Thuvvhvbxlepa-5-Nitro-1, 3-Propanediol - -     4  3, 4, 4' - Triclocarban - -    45 5 4 - Chloro - 3 - Cresol - -     6 4 - Chloro - 4 - Xylenol (PCMX) - -     7 7-Ethylbicyclooxazolidine (Bioban [id number]) - -     8 Benzalkonium Chloride - -     9 Benzyl Alcohol - -    50 10 Cetalkonium Chloride - -     11 Cetylpyrimidine Chloride  - -     12 Chloroacetamide - -     13 DMDM Hydantoin - -     14 Glutaraldehyde - -    55 15 Triclosan - -     16 Glyoxal Trimeric Dihydrate - -     17 Iodopropynyl Butylcarbamate - -     18 Octylisothiazoline - -     19 Iodoform NA NA    60 20 (Nitrobutyl) Morpholine/(Ethylnitro-Trimethylene) Dimorpholine (Bioban P 1487) - -     21 Phenoxyethanol - -     22 Phenyl Salicylate - -     23 Povidone Iodine - -     24 Sodium Benzoate - -    65 25 Sodium Disulfite - -     26 Sorbic Acid - -     27 Thimerosal       28 Melamine Formaldehyde Resin       29 Ethylenediamine Dihydrochloride        Parabens      70 30 Butyl-P-Hydroxybenzoate - -     31 Ethyl-P-Hydroxybenzoate - -     32 Methyl-P-Hydroxybenzoate - -    73 33 Propyl-P-Hydroxybenzoate - -    EMULSIFIERS & ADDITIVES       No Substance 2 days 4 days remarks   74 1 Polyethylene Glycol-400 - -    75 2 Cocamidopropyl Betaine NA NA     3 Amerchol L101 - -     4 Propylene Glycol - -     5 Triethanolamine - -     6 Sorbitane Sesquiolate NA NA    80 7 Isopropylmyristate - -     8 Polysorbate 80  - -     9 Amidoamine   (Stearamidopropyl Dimethylamine) - -     10 Oleamidopropyl Dimethylamine NA NA     11 Lauryl Glucoside - -    85 12 Coconut Diethanolamide  - -     13 2-Hydroxy-4-Methoxy Benzophenone (Oxybenzone) - -     14 Benzophenone-4 (Sulisobenzon) - -     15 Propolis - -     16 Dexpanthenol - -     90 17 Carboxymethyl Cellulose Sodium NA NA     18 Abitol - -     19 Tert-Butylhydroquinone - -     20 Benzyl Salicylate - -      Antioxidant       21 Dodecyl Gallate - -    95 22 Butylhydroxyanisole (BHA) - -     23 Butylhydroxytoluene (BHT) - -     24 Di-Alpha-Tocopherol (Vit E) - -     25 Propyl Gallate - -     26 Zinc Pyrithione NA NA    100 27 Dimethylaminopropylamin (DMAPA) NA NA    COLORANTS, DYES, FOOD ADDITIVES   No Substance 2 days 4 days remarks   101 1 Aniline - -     2 Lloyd Brown R - -     3 Textile Dye Mix [A] - -     4 [place]  - -     5 Disperse Blue-3 - -     6 Disperse Orange-3 - -     7 Disperse Red-11 - -     8 Disperse Yellow-9 - -     9 Erythrosine-B - -    110 10 Naphthol AS - -       Food additives       11 Aspartame - -     12 Azorubine - -     13 Brilliant Black - -     14 Cochineal Red - -     15 Patent Blue-VF - -     16 Pectin - -     17 Quinoline Yellow - -     18 Saccharin - -     19 Tartrazine - -    120 20 Sodium Glutamate - -    PERFUMES, FLAVORS & PLANTS (added 06/22/2021)          No Substance 2 days 4 days remarks   121 1 Benzyl Cinnamate - -     2 Di-Limonene (Dipentene) - -     3 Cananga Odorata (Ruby Beltran) (I) - -     4 Lichen Acid Mix - -    125 5 Mentha Piperita Oil (Peppermint Oil) - -     6 Sesquiterpenelactone mix - -     7 Tea Tree Oil, Oxidized - -     8 Wood Tar Mix - -     9 Abietic Acid - -    130 10 Lavendula Angustifolia Oil (Lavender Oil) - -     11 Camphor  - -      Fragrance Mix I       12 Oakmoss Absolute - -     13 Eugenol - -     14 Geraniol - -    135 15 Hydroxycitronellal - -     16 Isoeugenol - -     17 Cinnamic Aldehyde - -     18 Cinnamic Alcohol  - -      Fragrance mix II       19 Citronellol - -    140 20 Alpha-Hexylcinnamic Aldehyde    - -     21 Citral - -     22 Farnesol - -    143 23 Coumarin - -      OTHER PRODUCTS 06/23/2021        No Substance Conc  % solv 2 days 4 days remarks    1 Cheese P         2 Cheese 0         3          4           "5          6          7          8          9          10           Patients own products:  è DO NOT test if chemical or biological identity is unknown!   - always ask from patient the product information and safety sheets and consult with the physician   - check neutral pH with pH indicator paper (do not test pH below 5 or over 8 or consult with physician)  - leave-on cosmetics can be tested \"as is\"  - rinse-off products have to be diluted with water, buffer, olive oil or paraffin (discuss with physician)  à remember:   - non-specific toxic/corrosive or biological reactions can occur  - tests with patients own products are not standardized and test conditions are not optimized for patch tests. Whenever possible test with standardized test series and correlate use of product with the result of the patch tests  - if not sure if compounds can be tested under occlusion in patch tests consider open application tests    Results of patch tests:                         Interpretation:  - Negative                    A    = Allergic      (+) Erythema    TI   = Toxic/irritant   + E + Infiltration    RaP = Relevance at Present     ++ E/I + Papulovesicle   Rpr  = Relevance Previously     +++ E/I/P + Blister     nR   = No Relevance    Atopy Screen (Placed Jun 23, 2021)    No Substance Readings (15 min) Evaluation   POS Histamine 1mg/ml +    NEG NaCl 0.9% -      No Substance Readings (15 min) Evaluation   1 Alternaria alternata (tenuis)  -    2 Cladosporium herbarum -    3 Aspergillus fumigatus -    4 Penicillium notatum -    5 Dermatophagoides pteronyssinus -    6 Dermatophagoides farinae -    7 Dog epithelium (canis spp) -    8 Cat hair (alessandro catus) -    9 Cockroach   (Blatella americana & germanica) -    10 Grass mix midwest   (Yamileth, Orchard, Redtop, Tariq) -    11 Abhay grass (sorghum halepense) -    12 Weed mix   (common Cocklebur, Lamb s quarters, rough redroot Pigweed, Dock/Sorrel) -    13 Mug wort (artemisia vulgare) " -    14 Ragweed giant/short (ambrosia spp) -    15 White birch (Betula papyrifera) -    16 Tree mix 1 (Pecan, Maple BHR, Oak RVW, american Ogden, black Clermont) -    17 Red cedar (juniperus virginia) -    18 Tree mix 2   (white Alexander, river/red Birch, black Alsen, common Lincoln, american Elm) -    19 Box elder/Maple mix (acer spp) -    20 St. Clair shagbark (carya ovata) -           Conclusion: no signs for atopy      Intradermal Testing (Placed Jun 23, 2021)    No Substance Conc.  Readings (15min) Evaluation   DF Standard Dust Mite - D. Farinae 1:10 +/++ 8mmP/10mmE   DP Standard Dust Mite - D. Pteronyssinus 1:10 (+) 6mmP/no E   A Aspergillus fumigatus  1:10 - neg   P Penicillium notatum 1:10 - neg   C Cat epithelium  1:10 - neg     1:10 -    Conclusion: slight immediate type reaction to the house dust mite D. Farinae, not to mold or cat. After 2 days no clear delayed reactions    [x] No relevant allergic reaction observed    [] Allergic reaction diagnosed against following allergens:      Interpretation/ remarks:   See later    [] Patient information given   [] ACDS CAMP information's (# ....) to following compounds: .....   [] General information's to following compounds: ......      Assessment & Plan:    ==> Final Diagnosis:     # suspicion for allergic contact sensitization on lips/oral DDx organic tooth paste or lip balm   Unlikely reaction to the PDT   * chronic, active    # atopic predisposition with:    RC to cats  * chronic, active    These conclusions are made at the best of one's knowledge and belief based on the provided evidence such as patient's history and allergy test results and they can change over time or can be incomplete because of missing information's.    ==> Treatment Plan:  See later    Referred by: Dr. Rocky Leonard    Follow-up in Derm-Allergy clinic for 2nd readings and final conclusions after 4 days (in person)     Staff: : provider    I spent a total of 18 minutes with Srinivasan MCCARTHY  Rosa during today s  visit. This time was spent discussing all the individual test results, correlating them to the clinical relevance, counseling the patient and/or coordinating care        Again, thank you for allowing me to participate in the care of your patient.        Sincerely,        Rashard Young MD

## 2021-06-23 NOTE — NURSING NOTE
Dermatology Rooming Note    Srinivasan Lee's goals for this visit include:   Chief Complaint   Patient presents with     Allergies     Srinivasan is here today allergy follow up.      MONA Macedo

## 2021-06-24 NOTE — PROGRESS NOTES
Formerly [facility] Dermato-allergology Note  Office visit  Encounter Date: Jun 25, 2021  ____________________________________________    CC: No chief complaint on file.      HPI:  Mr. Srinivasan Lee is a(n) 66 year old male who presents today as a return patient for allergy consultation  - Follow-up in Derm-Allergy clinic for 2nd readings and final conclusions after 4 days (in person)   - otherwise feeling well in usual state of health    Physical exam:  General: In no acute distress, well-developed, well-nourished  Eyes: no conjunctivitis  ENT: no signs of rhinitis   Pulmonary: no wheezing or coughing  Skin:Focused examination of the skin on test sites was performed = see test results below    Earlier History and Allergy exams:    Patient sent by Dr. Hidalgo [facility]  Patient had a treatment with Levulan and red light = Jan 5th 2h treatment on face and lips = for few days red skin  3.5 weeks later patient developed a very itchy rash around the lips and oral cavity mostly  Swelling resolved after about 1 day, but redness stayed for couple weeks with desquamation. Desonide cream improved the eczema and stopped around March 9th. Then rebound and re-used Desonide and since about 2 weeks no desonide and no recurrence    Stopped Desonide about     Used then Aquaphor advanced and straight Aloe  Has started to use an organic toothpaste from [employer]      Past Medical History:   There is no problem list on file for this patient.    No past medical history on file.    Allergy History:     Allergies   Allergen Reactions     Animal Dander Other (See Comments)     Congestion, sneezing     Mercury Other (See Comments)     Used in eye solution---itching eyes, redness     Prednisone      Other reaction(s): Tachycardia  Heart racing       Social History:  The patient is retired. Patient has the following hobbies or non-occupational exposure      reports that he has never smoked. He has never used smokeless  tobacco.      Family History:  No family history on file.    Medications:  Current Outpatient Medications   Medication Sig Dispense Refill     desonide (DESOWEN) 0.05 % external cream APPLY THIN LAYER TO AFFECTED AREAS ON FACE TWICE DAILY X2 WEEKS, THEN DISCONTINUE       EPINEPHrine (ANY BX GENERIC EQUIV) 0.3 MG/0.3ML injection 2-pack INJECT INTO THIGH MUSCLE AS NEEDED FOR ANAPHYLAXIS.       famotidine (PEPCID) 10 MG tablet Take 10 mg by mouth       ibuprofen (ADVIL/MOTRIN) 200 MG tablet        Multiple Vitamin (MULTI-VITAMINS) TABS Take 1 tablet by mouth         Allergy Tests:    Past Allergy Test    Future Allergy Test: 4/2/2021     Order for PATCH TESTS    [] Outpatient  [] Inpatient: [facility]..../ Bed ....      Skin Atopy (atopic dermatitis) [] Yes   [x] No as baby milk allergy  Contact allergies:   [x] Yes   [] No to eye drops in 2009  Urticaria/Angioedema  [] Yes   [x] No  Rhinitis/Sinusitis:   [] Yes   [x] No   [] seasonal [] perennial            Allergic Asthma:   [] Yes   [x] No  Pets :  [] Yes   [] No  Which? RC to cats  Food Allergy:   [] Yes   [x] No  Drug allergies:   [] Yes   [x] No  Hand eczema:   [] Yes   [x] No   Leading hand:   [] R   [] L       [] Ambidextrous                      Reason for tests (suspected allergy): acute dermatitis perioral  Known previous allergies: none  Standardized panels  [x] Standard panel (40 tests)  [x] Preservatives & Antimicrobials (31 tests)  [x] Emulsifiers & Additives (25 tests)   [x] Perfumes/Flavours & Plants (25 tests)  [] Hairdresser panel (12 tests)  [] Rubber Chemicals (22 tests)  [] Plastics (26 tests)  [] Colorants/Dyes/Food additives (20 tests)  [] Metals (implants/dental) (24 tests)  [] Local anaesthetics/NSAIDs (13 tests)  [x] Antibiotics & Antimycotics (14 tests)   [] Corticosteroids (15 tests)   [] Photopatch test (62 tests)   [] others: ...      [x] Patient's own products: organic toothpaste from [employer], Aquaphor, Eucerin cleanser, [place] lip    DO  NOT test if chemical or biological identity is unknown!     always ask from patient the product information and safety sheets (MSDS)   [x] Atopy screen prick test (Atopic predisposition): ...    RESULTS & EVALUATION of PATCH TESTS    Patch test readings after     [x] 2 days, [] 3 days [x] 4 days, [] 5 days,    Jun 25, 2021 application of patch tests with results:    STANDARD Series        No Substance 2 days 4 days remarks   1 1 Aashish Mix [C] - -     2 Colophony - -     3  2-Mercaptobenzothiazole  - -      4 Methylisothiazolinone - -    5 5 Carba Mix - -     6 Thiuram Mix [A] - -     7 Bisphenol A Epoxy Resin - -     8 A-Bawh-Ejjlnyojjvg-Formaldehyde Resin - -     9 Mercapto Mix [A] - -    10 10 Black Rubber Mix- PPD [B] - -     11 Potassium Dichromate  +  -     12 Balsam of [place] (Myroxylon Pereirae Resin) - -     13 Nickel Sulphate Hexahydrate - -     14 Mixed Dialkyl Thiourea - -    15 15 Paraben Mix [B] - -     16 Methyldibromo Glutaronitrile - -     17 Fragrance Mix - -     18 2-Bromo-2-Nitropropane-1,3-Diol (Bronopol) NA NA     19 Lyral - -    20 20 Tixocortol-21- Pivalate - -     21 Diazolidiyl Urea (Germall II) - -     22 Methyl Methacrylate NA NA     23 Cobalt (II) Chloride Hexahydrate - -     24 Fragrance Mix II  - -    25 25 Compositae Mix - -     26 Benzoyl Peroxide - -     27 Bacitracin - -     28 Formaldehyde - -     29 Methylchloroisothiazolinone / Methylisothiazolinone - -    30 30 Corticosteroid Mix - -     31 Sodium Lauryl Sulfate - -     32 Lanolin Alcohol - -     33 Turpentine - -     34 Cetylstearylalcohol - -    35 35 Chlorhexidine Dicluconate - -     36 Budenoside - -     37 Imidazolidinyl Urea  - -     38 Ethyl-2 Cyanoacrylate - -     39 Quaternium 15 (Dowicil 200) - -    40 40 Decyl Glucoside - -    PRESERVATIVES & ANTIMICROBIALS        No Substance 2 days 4 days remarks   41 1  1,2-Benzisothiazoline-3-One, Sodium Salt - -     2  1,3,5-Cy (2-Hydroxyethyl) - Hexahydrotriazine (Grotan BK) - -      3 8-Ylppzbcodbfip-0-Nitro-1, 3-Propanediol - -     4  3, 4, 4' - Triclocarban - -    45 5 4 - Chloro - 3 - Cresol - -     6 4 - Chloro - 4 - Xylenol (PCMX) - -     7 7-Ethylbicyclooxazolidine (Bioban FZ0630) - -     8 Benzalkonium Chloride - -     9 Benzyl Alcohol - -    50 10 Cetalkonium Chloride - -     11 Cetylpyrimidine Chloride  - -     12 Chloroacetamide - -     13 DMDM Hydantoin - -     14 Glutaraldehyde - -    55 15 Triclosan - -     16 Glyoxal Trimeric Dihydrate - -     17 Iodopropynyl Butylcarbamate - -     18 Octylisothiazoline - -     19 Iodoform NA NA    60 20 (Nitrobutyl) Morpholine/(Ethylnitro-Trimethylene) Dimorpholine (Bioban P 1487) - -     21 Phenoxyethanol - -     22 Phenyl Salicylate - -     23 Povidone Iodine - +/++     24 Sodium Benzoate - -    65 25 Sodium Disulfite - -     26 Sorbic Acid - -     27 Thimerosal       28 Melamine Formaldehyde Resin       29 Ethylenediamine Dihydrochloride        Parabens      70 30 Butyl-P-Hydroxybenzoate - -     31 Ethyl-P-Hydroxybenzoate - -     32 Methyl-P-Hydroxybenzoate - -    73 33 Propyl-P-Hydroxybenzoate - -    EMULSIFIERS & ADDITIVES       No Substance 2 days 4 days remarks   74 1 Polyethylene Glycol-400 - -    75 2 Cocamidopropyl Betaine NA NA     3 Amerchol L101 - -     4 Propylene Glycol - -     5 Triethanolamine - -     6 Sorbitane Sesquiolate NA NA    80 7 Isopropylmyristate - -     8 Polysorbate 80  - -     9 Amidoamine   (Stearamidopropyl Dimethylamine) - -     10 Oleamidopropyl Dimethylamine NA NA     11 Lauryl Glucoside - -    85 12 Coconut Diethanolamide  - -     13 2-Hydroxy-4-Methoxy Benzophenone (Oxybenzone) - -     14 Benzophenone-4 (Sulisobenzon) - -     15 Propolis - -     16 Dexpanthenol - -    90 17 Carboxymethyl Cellulose Sodium NA NA     18 Abitol - -     19 Tert-Butylhydroquinone - -     20 Benzyl Salicylate - -      Antioxidant       21 Dodecyl Gallate - -    95 22 Butylhydroxyanisole (BHA) - -     23 Butylhydroxytoluene (BHT)  - -     24 Di-Alpha-Tocopherol (Vit E) - -     25 Propyl Gallate - -     26 Zinc Pyrithione NA NA    100 27 Dimethylaminopropylamin (DMAPA) NA NA    COLORANTS, DYES, FOOD ADDITIVES   No Substance 2 days 4 days remarks   101 1 Aniline - -     2 Lloyd Brown R - -     3 Textile Dye Mix [A] - -     4 [place]  - -     5 Disperse Blue-3 - -     6 Disperse Orange-3 - -     7 Disperse Red-11 - -     8 Disperse Yellow-9 - -     9 Erythrosine-B - -    110 10 Naphthol AS - -       Food additives       11 Aspartame - -     12 Azorubine - -     13 Brilliant Black - -     14 Cochineal Red - -     15 Patent Blue-VF - -     16 Pectin - -     17 Quinoline Yellow - -     18 Saccharin - -     19 Tartrazine - -    120 20 Sodium Glutamate - -    PERFUMES, FLAVORS & PLANTS (added 06/22/2021)          No Substance 2 days 4 days remarks   121 1 Benzyl Cinnamate - -     2 Di-Limonene (Dipentene) - -     3 [place]anga Odorata (Ruby Beltran) (I) - -     4 Lichen Acid Mix - -    125 5 Mentha Piperita Oil (Peppermint Oil) - -     6 Sesquiterpenelactone mix - -     7 Tea Tree Oil, Oxidized - -     8 Wood Tar Mix - -     9 Abietic Acid - -    130 10 Lavendula Angustifolia Oil (Lavender Oil) - -     11 Camphor  - -      Fragrance Mix I       12 [place] Absolute - -     13 Eugenol - -     14 Geraniol - -    135 15 Hydroxycitronellal - -     16 Isoeugenol - -     17 Cinnamic Aldehyde - -     18 Cinnamic Alcohol  - -      Fragrance mix II       19 Citronellol - -    140 20 Alpha-Hexylcinnamic Aldehyde    - -     21 Citral - -     22 Farnesol - -    143 23 Coumarin - -        Results of patch tests:                         Interpretation:  - Negative                    A    = Allergic      (+) Erythema    TI   = Toxic/irritant   + E + Infiltration    RaP = Relevance at Present     ++ E/I + Papulovesicle   Rpr  = Relevance Previously     +++ E/I/P + Blister     nR   = No Relevance    Atopy Screen (Placed Jun 25, 2021)    No Substance Readings (15 min)  Evaluation   POS Histamine 1mg/ml +    NEG NaCl 0.9% -      No Substance Readings (15 min) Evaluation   1 Alternaria alternata (tenuis)  -    2 Cladosporium herbarum -    3 Aspergillus fumigatus -    4 Penicillium notatum -    5 Dermatophagoides pteronyssinus -    6 Dermatophagoides farinae -    7 Dog epithelium (canis spp) -    8 Cat hair (alessandro catus) -    9 Cockroach   (Blatella americana & germanica) -    10 Grass mix midwest   (Yamileth, Orchard, Redtop, Tariq) -    11 Abhay grass (sorghum halepense) -    12 Weed mix   (common Cocklebur, Lamb s quarters, rough redroot Pigweed, Dock/Sorrel) -    13 Mug wort (artemisia vulgare) -    14 Ragweed giant/short (ambrosia spp) -    15 White birch (Betula papyrifera) -    16 Tree mix 1 (Pecan, Maple BHR, Oak RVW, american White Heath, black Morris) -    17 Red cedar (juniperus virginia) -    18 Tree mix 2   (white [place], river/red Birch, black [place], common [place], american Elm) -    19 Box elder/Maple mix (acer spp) -    20 Meagher shagbark (carya ovata) -           Conclusion: no signs for atopy      Intradermal Testing (Placed Jun 25, 2021)    No Substance Conc.  Readings (15min) Evaluation   DF Standard Dust Mite - D. Farinae 1:10 +/++ 8mmP/10mmE   DP Standard Dust Mite - D. Pteronyssinus 1:10 (+) 6mmP/no E   A Aspergillus fumigatus  1:10 - neg   P Penicillium notatum 1:10 - neg   C Cat epithelium  1:10 - neg     1:10 -    Conclusion: slight immediate type reaction to the house dust mite D. Farinae, not to mold or cat. After 4 days intiltrated papule of about 6mm diameter over test site of Aspergillus fumigatus.   No sign for delayed reaction to mold Penicillium, cat and dust mites    [x] Allergic reaction diagnosed against following allergens:  PVP Iodine +/++  Aspergillus fumigatus delayed reaction and D. Farinae dust mite immediate reaction      [x] Patient information given   [] ACDS CAMP information's (# ....) to following compounds: .....   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    ==> Treatment Plan:     Avoid in future use of PVP Iodine (put in allergy list)    Info given for molds and house dust mites and try to avoid    If recurrences of lip dermatitis, use Vaseline and if necessary Desonide. In future also Protopic or Elidel could be used    Try to use as lip balm only Vaseline    Referred by: Dr. Hidalgo [facility]      Staff: : provider    Follow-up in Derm-Allergy clinic if necessary  ___________________________    I spent a total of 30 minutes with Srinivasan Lee during today s  visit. This time was spent discussing all the individual test results, correlating them to the clinical relevance, counseling the patient and/or coordinating care and performing allergy tests or procedures.

## 2021-06-25 ENCOUNTER — OFFICE VISIT (OUTPATIENT)
Dept: ALLERGY | Facility: CLINIC | Age: 66
End: 2021-06-25
Payer: COMMERCIAL

## 2021-06-25 DIAGNOSIS — J30.2 SEASONAL AND PERENNIAL ALLERGIC RHINOCONJUNCTIVITIS: ICD-10-CM

## 2021-06-25 DIAGNOSIS — L20.89 OTHER ATOPIC DERMATITIS: ICD-10-CM

## 2021-06-25 DIAGNOSIS — J30.89 SEASONAL AND PERENNIAL ALLERGIC RHINOCONJUNCTIVITIS: ICD-10-CM

## 2021-06-25 DIAGNOSIS — L23.89 ALLERGIC CONTACT DERMATITIS DUE TO OTHER AGENTS: Primary | ICD-10-CM

## 2021-06-25 DIAGNOSIS — H10.10 SEASONAL AND PERENNIAL ALLERGIC RHINOCONJUNCTIVITIS: ICD-10-CM

## 2021-06-25 PROCEDURE — 99214 OFFICE O/P EST MOD 30 MIN: CPT | Performed by: DERMATOLOGY

## 2021-06-25 PROCEDURE — 95024 IQ TESTS W/ALLERGENIC XTRCS: CPT | Performed by: DERMATOLOGY

## 2021-06-25 PROCEDURE — 95004 PERQ TESTS W/ALRGNC XTRCS: CPT | Performed by: DERMATOLOGY

## 2021-06-25 NOTE — PATIENT INSTRUCTIONS
Mold Allergy    Molds are thread-like micro-fungi - they occur all over the world and grow particularly in places where there is moisture. If living spaces are infested with mold, it must be removed quickly. It may pose a health risk.    Triggers of mold allergy   The most important known allergens among fungi are Aspergillus species, Alternaria alternata, Cladosporium species and Penicillium species, which all belong to the category of molds. Molds are found all over the world, but predominantly in nature, mainly in the soil, in dead organic matter. Indoors molds develop in places where it is damp, such as areas affected by leaks, in kerri cracks or when the relative air humidity is high. Poorly maintained ventilation systems, air humidifiers, aquariums, or decorative fountains and a lot of plants in a room can also lead to a mold infestation.    Symptoms   Mold allergy, like other respiratory allergies, is manifest as allergic runny nose, streaming eyes, cough and shortness of breath and is frequently accompanied by asthma. As well as allergic reactions, molds also cause irritation of the airways, the mucous membranes of the eyes and the skin. The growth of mold is often associated with a distinctively musty smell of earth, damp and mushrooms, which additionally impairs well-being.    Therapy and treatment   Any fungal infestation in living areas must be removed rapidly and properly. It is always important to get rid of the cause, hence eradicate the damp problem. Otherwise the mold will quickly reappear. People with health problems should not remove even small patches of mold growth. Before the mold is cleared and in the weeks following its clearance, rooms should also be aired frequently and dust should be removed.    Tips and tricks:     Keep relative air humidity to a maximum of 45 per cent in winter    Keep temperature between 66 and 73 C in winter    Air rooms thoroughly for five to ten minutes twice to  "three times a day    Position furniture at least ten centimetres away from walls    No plants in the bedroom area    Dispose of compost or organic waste on a daily basis or store temporarily outside the home    Do not use air humidifiers or only use them if air humidity is continuously monitored        Modified from \"Mould Allergy\" by [employer]! [facility].    House Dust Mite Allergy        The house dust mite is an arachnid about 0.3 mm in size and not visible to the naked eye. There are around 150 species of house dust mites in the world. One mite produces up to 40 fecal droppings a day. One teaspoonful of bedroom dust contains an average of nearly 1000 mites and 250,000 minute droppings.    Causes and triggers of house dust mite allergy  The house dust mite requires a warm, moist environment without light in order to live and reproduce. Our beds are ideal. The mite feeds on human and animal skin scales. The allergen is mainly contained in the mite's feces. The feces contain allergy-triggering constituents which are spread in fine dust, are breathed in and can cause an allergic reaction.    Symptoms  When the allergens come into contact with the mucous membranes in the eyes, nose, mouth and throat, sufferers develop symptoms typical of an allergic cold (allergic rhinitis) or an allergic inflammation of the conjunctiva (allergic conjunctivitis): blocked or runny nose, sneezing, red, itchy eyes. If all of these symptoms are present, then the condition is also known as rhinoconjunctivitis. Often, the upper respiratory tract becomes chronically inflamed, primarily because house dust mites are present all year round.  The symptoms of house dust mite allergy typically occur in the morning and are more frequent in the cold months of the year.    Therapy and treatment  As a first step, mattress, pillows and duvet/comforter should be placed in mite-proof or anti-mite covers, sometimes known as encasings. Alternatively " "you can use pillows or comforter that can be washed at over 130 F monthly. At the same time, house dust should be minimized. If necessary, the symptoms can be treated with medication, for example antihistamines in the form of nasal sprays, eye drops and tablets. Desensitization/specific immunotherapy (SIT) is recommended for house dust allergy if all the measures above are not sufficient.    Tips and tricks:    Keep room temperature at 66-70 F and relative air humidity at a maximum of 50%.    Ideally, thoroughly air your home two to three times a day for 5 to 10 minutes each time.    Wash bed linens in at least 130 F every week.    Remove stuffed animals or freeze them every other week.    Keep ceiling fans off in the bedroom as they can stir up dust mite allergens.    Remove dust from furniture with a damp cloth and regularly wet mop floors.    Do not put pot and hydroponic plants in the bedroom and also avoid putting too many in living areas, as they increase room humidity.    When staying overnight in other accommodations, we recommend taking your own bed linen and the above anti-mite mattress covers with you.    Remove upholstered furniture from the bedroom and consider removing the carpets. Ideally, use sealed parquet or laminate chaim, cork tiles or chaim made of wood, novilon or PVC.    Maybe additionally reduce dust mites in mattress, upholstery, or chaim using hot steam .      Modified from \"House Dust Mite Allergy\" by [employer]! [facility].      RESULTS & EVALUATION of PATCH TESTS    Patch test readings after     [x] 2 days, [] 3 days [x] 4 days, [] 5 days,    Jun 25, 2021 application of patch tests with results:    STANDARD Series        No Substance 2 days 4 days remarks   1 1 Aashish Mix [C] - -     2 Colophony - -     3  2-Mercaptobenzothiazole  - -      4 Methylisothiazolinone - -    5 5 Carba Mix - -     6 Thiuram Mix [A] - -     7 Bisphenol A Epoxy Resin - -     8 " Y-Zyxw-Aazikpooyeg-Formaldehyde Resin - -     9 Mercapto Mix [A] - -    10 10 Black Rubber Mix- PPD [B] - -     11 Potassium Dichromate  +  -     12 Balsam of [place] (Myroxylon Pereirae Resin) - -     13 Nickel Sulphate Hexahydrate - -     14 Mixed Dialkyl Thiourea - -    15 15 Paraben Mix [B] - -     16 Methyldibromo Glutaronitrile - -     17 Fragrance Mix - -     18 2-Bromo-2-Nitropropane-1,3-Diol (Bronopol) NA NA     19 Lyral - -    20 20 Tixocortol-21- Pivalate - -     21 Diazolidiyl Urea (Germall II) - -     22 Methyl Methacrylate NA NA     23 Cobalt (II) Chloride Hexahydrate - -     24 Fragrance Mix II  - -    25 25 Compositae Mix - -     26 Benzoyl Peroxide - -     27 Bacitracin - -     28 Formaldehyde - -     29 Methylchloroisothiazolinone / Methylisothiazolinone - -    30 30 Corticosteroid Mix - -     31 Sodium Lauryl Sulfate - -     32 Lanolin Alcohol - -     33 Turpentine - -     34 Cetylstearylalcohol - -    35 35 Chlorhexidine Dicluconate - -     36 Budenoside - -     37 Imidazolidinyl Urea  - -     38 Ethyl-2 Cyanoacrylate - -     39 Quaternium 15 (Dowicil 200) - -    40 40 Decyl Glucoside - -    PRESERVATIVES & ANTIMICROBIALS        No Substance 2 days 4 days remarks   41 1  1,2-Benzisothiazoline-3-One, Sodium Salt - -     2  1,3,5-Cy (2-Hydroxyethyl) - Hexahydrotriazine (Grotan BK) - -     3 0-Juxofjpdwjouu-0-Nitro-1, 3-Propanediol - -     4  3, 4, 4' - Triclocarban - -    45 5 4 - Chloro - 3 - Cresol - -     6 4 - Chloro - 4 - Xylenol (PCMX) - -     7 7-Ethylbicyclooxazolidine (Bioban VR9183) - -     8 Benzalkonium Chloride - -     9 Benzyl Alcohol - -    50 10 Cetalkonium Chloride - -     11 Cetylpyrimidine Chloride  - -     12 Chloroacetamide - -     13 DMDM Hydantoin - -     14 Glutaraldehyde - -    55 15 Triclosan - -     16 Glyoxal Trimeric Dihydrate - -     17 Iodopropynyl Butylcarbamate - -     18 Octylisothiazoline - -     19 Iodoform NA NA    60 20 (Nitrobutyl)  Morpholine/(Ethylnitro-Trimethylene) Dimorpholine (Bioban P 1487) - -     21 Phenoxyethanol - -     22 Phenyl Salicylate - -     23 Povidone Iodine - +/++     24 Sodium Benzoate - -    65 25 Sodium Disulfite - -     26 Sorbic Acid - -     27 Thimerosal       28 Melamine Formaldehyde Resin       29 Ethylenediamine Dihydrochloride        Parabens      70 30 Butyl-P-Hydroxybenzoate - -     31 Ethyl-P-Hydroxybenzoate - -     32 Methyl-P-Hydroxybenzoate - -    73 33 Propyl-P-Hydroxybenzoate - -    EMULSIFIERS & ADDITIVES       No Substance 2 days 4 days remarks   74 1 Polyethylene Glycol-400 - -    75 2 Cocamidopropyl Betaine NA NA     3 Amerchol L101 - -     4 Propylene Glycol - -     5 Triethanolamine - -     6 Sorbitane Sesquiolate NA NA    80 7 Isopropylmyristate - -     8 Polysorbate 80  - -     9 Amidoamine   (Stearamidopropyl Dimethylamine) - -     10 Oleamidopropyl Dimethylamine NA NA     11 Lauryl Glucoside - -    85 12 Coconut Diethanolamide  - -     13 2-Hydroxy-4-Methoxy Benzophenone (Oxybenzone) - -     14 Benzophenone-4 (Sulisobenzon) - -     15 Propolis - -     16 Dexpanthenol - -    90 17 Carboxymethyl Cellulose Sodium NA NA     18 Abitol - -     19 Tert-Butylhydroquinone - -     20 Benzyl Salicylate - -      Antioxidant       21 Dodecyl Gallate - -    95 22 Butylhydroxyanisole (BHA) - -     23 Butylhydroxytoluene (BHT) - -     24 Di-Alpha-Tocopherol (Vit E) - -     25 Propyl Gallate - -     26 Zinc Pyrithione NA NA    100 27 Dimethylaminopropylamin (DMAPA) NA NA    COLORANTS, DYES, FOOD ADDITIVES   No Substance 2 days 4 days remarks   101 1 Aniline - -     2 Lloyd Brown R - -     3 Textile Dye Mix [A] - -     4 Allen  - -     5 Disperse Blue-3 - -     6 Disperse Orange-3 - -     7 Disperse Red-11 - -     8 Disperse Yellow-9 - -     9 Erythrosine-B - -    110 10 Naphthol AS - -       Food additives       11 Aspartame - -     12 Azorubine - -     13 Brilliant Black - -     14 Cochineal Red - -     15  Patent Blue-VF - -     16 Pectin - -     17 Quinoline Yellow - -     18 Saccharin - -     19 Tartrazine - -    120 20 Sodium Glutamate - -    PERFUMES, FLAVORS & PLANTS (added 06/22/2021)          No Substance 2 days 4 days remarks   121 1 Benzyl Cinnamate - -     2 Di-Limonene (Dipentene) - -     3 [place]anga Odorata (Ruby Beltran) (I) - -     4 Lichen Acid Mix - -    125 5 Mentha Piperita Oil (Peppermint Oil) - -     6 Sesquiterpenelactone mix - -     7 Tea Tree Oil, Oxidized - -     8 Wood Tar Mix - -     9 Abietic Acid - -    130 10 Lavendula Angustifolia Oil (Lavender Oil) - -     11 Camphor  - -      Fragrance Mix I       12 [place] Absolute - -     13 Eugenol - -     14 Geraniol - -    135 15 Hydroxycitronellal - -     16 Isoeugenol - -     17 Cinnamic Aldehyde - -     18 Cinnamic Alcohol  - -      Fragrance mix II       19 Citronellol - -    140 20 Alpha-Hexylcinnamic Aldehyde    - -     21 Citral - -     22 Farnesol - -    143 23 Coumarin - -        Results of patch tests:                         Interpretation:  - Negative                    A    = Allergic      (+) Erythema    TI   = Toxic/irritant   + E + Infiltration    RaP = Relevance at Present     ++ E/I + Papulovesicle   Rpr  = Relevance Previously     +++ E/I/P + Blister     nR   = No Relevance    Atopy Screen (Placed Jun 25, 2021)    No Substance Readings (15 min) Evaluation   POS Histamine 1mg/ml +    NEG NaCl 0.9% -      No Substance Readings (15 min) Evaluation   1 Alternaria alternata (tenuis)  -    2 Cladosporium herbarum -    3 Aspergillus fumigatus -    4 Penicillium notatum -    5 Dermatophagoides pteronyssinus -    6 Dermatophagoides farinae -    7 Dog epithelium (canis spp) -    8 Cat hair (alessandro catus) -    9 Cockroach   (Blatella americana & germanica) -    10 Grass mix midwest   (Yamileth, Orchard, Redtop, Tariq) -    11 Abhay grass (sorghum halepense) -    12 Weed mix   (common Cocklebur, Lamb s quarters, rough redroot Pigweed,  Dock/Sorrel) -    13 Mug wort (artemisia vulgare) -    14 Ragweed giant/short (ambrosia spp) -    15 White birch (Betula papyrifera) -    16 Tree mix 1 (Pecan, Maple BHR, Oak RVW, american [place], black [place]) -    17 Red cedar (juniperus virgin[place]) -    18 Tree mix 2   (white [place], river/red Birch, black [place], common [place], american Elm) -    19 Box elder/Maple mix (acer spp) -    20 [place] shagbark (carya ovata) -           Conclusion: no signs for atopy      Intradermal Testing (Placed Jun 25, 2021)    No Substance Conc.  Readings (15min) Evaluation   DF Standard Dust Mite - D. Farinae 1:10 +/++ 8mmP/10mmE   DP Standard Dust Mite - D. Pteronyssinus 1:10 (+) 6mmP/no E   A Aspergillus fumigatus  1:10 - neg   P Penicillium notatum 1:10 - neg   C Cat epithelium  1:10 - neg     1:10 -    Conclusion: slight immediate type reaction to the house dust mite D. Farinae, not to mold or cat. After 4 days intiltrated papule of about 6mm diameter over test site of Aspergillus fumigatus.   No sign for delayed reaction to mold Penicillium, cat and dust mites    [x] Allergic reaction diagnosed against following allergens:  PVP Iodine +/++  Aspergillus fumigatus delayed reaction and D. Farinae dust mite immediate reaction      [x] Patient information given   [] ACDS CAMP information's (# ....) to following compounds: .....   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    ==> Treatment Plan:     Avoid in future use of PVP Iodine (put in allergy list)    Info given for molds and house dust mites and try to avoid    If recurrences of lip dermatitis, use Vaseline and if necessary Desonide. In future also Protopic or Elidel could be used    Try to use as lip balm only Vaseline

## 2021-06-25 NOTE — LETTER
6/25/2021         RE: Srinivasan Lee  [address]  [place] [address]        Dear Colleague,    Thank you for referring your patient, Srinivasan Lee, to the [facility] [place]. Please see a copy of my visit note below.    [facility] Dermato-allergology Note  Office visit  Encounter Date: Jun 25, 2021  ____________________________________________    CC: No chief complaint on file.      HPI:  Mr. Srinivasan Lee is a(n) 66 year old male who presents today as a return patient for allergy consultation  - Follow-up in Derm-Allergy clinic for 2nd readings and final conclusions after 4 days (in person)   - otherwise feeling well in usual state of health    Physical exam:  General: In no acute distress, well-developed, well-nourished  Eyes: no conjunctivitis  ENT: no signs of rhinitis   Pulmonary: no wheezing or coughing  Skin:Focused examination of the skin on test sites was performed = see test results below    Earlier History and Allergy exams:    Patient sent by Dr. Hidalgo [facility]  Patient had a treatment with Levulan and red light = Jan 5th 2h treatment on face and lips = for few days red skin  3.5 weeks later patient developed a very itchy rash around the lips and oral cavity mostly  Swelling resolved after about 1 day, but redness stayed for couple weeks with desquamation. Desonide cream improved the eczema and stopped around March 9th. Then rebound and re-used Desonide and since about 2 weeks no desonide and no recurrence    Stopped Desonide about     Used then Aquaphor advanced and straight Aloe  Has started to use an organic toothpaste from [employer]      Past Medical History:   There is no problem list on file for this patient.    No past medical history on file.    Allergy History:     Allergies   Allergen Reactions     Animal Dander Other (See Comments)     Congestion, sneezing     Mercury Other (See Comments)     Used in eye  solution---itching eyes, redness     Prednisone      Other reaction(s): Tachycardia  Heart racing       Social History:  The patient is retired. Patient has the following hobbies or non-occupational exposure      reports that he has never smoked. He has never used smokeless tobacco.      Family History:  No family history on file.    Medications:  Current Outpatient Medications   Medication Sig Dispense Refill     desonide (DESOWEN) 0.05 % external cream APPLY THIN LAYER TO AFFECTED AREAS ON FACE TWICE DAILY X2 WEEKS, THEN DISCONTINUE       EPINEPHrine (ANY BX GENERIC EQUIV) 0.3 MG/0.3ML injection 2-pack INJECT INTO THIGH MUSCLE AS NEEDED FOR ANAPHYLAXIS.       famotidine (PEPCID) 10 MG tablet Take 10 mg by mouth       ibuprofen (ADVIL/MOTRIN) 200 MG tablet        Multiple Vitamin (MULTI-VITAMINS) TABS Take 1 tablet by mouth         Allergy Tests:    Past Allergy Test    Future Allergy Test: 4/2/2021     Order for PATCH TESTS    [] Outpatient  [] Inpatient: [facility]..../ Bed ....      Skin Atopy (atopic dermatitis) [] Yes   [x] No as baby milk allergy  Contact allergies:   [x] Yes   [] No to eye drops in 2009  Urticaria/Angioedema  [] Yes   [x] No  Rhinitis/Sinusitis:   [] Yes   [x] No   [] seasonal [] perennial            Allergic Asthma:   [] Yes   [x] No  Pets :  [] Yes   [] No  Which? [facility] to cats  Food Allergy:   [] Yes   [x] No  Drug allergies:   [] Yes   [x] No  Hand eczema:   [] Yes   [x] No   Leading hand:   [] R   [] L       [] Ambidextrous                      Reason for tests (suspected allergy): acute dermatitis perioral  Known previous allergies: none  Standardized panels  [x] Standard panel (40 tests)  [x] Preservatives & Antimicrobials (31 tests)  [x] Emulsifiers & Additives (25 tests)   [x] Perfumes/Flavours & Plants (25 tests)  [] Hairdresser panel (12 tests)  [] Rubber Chemicals (22 tests)  [] Plastics (26 tests)  [] Colorants/Dyes/Food additives (20 tests)  [] Metals (implants/dental) (24 tests)  []  Local anaesthetics/NSAIDs (13 tests)  [x] Antibiotics & Antimycotics (14 tests)   [] Corticosteroids (15 tests)   [] Photopatch test (62 tests)   [] others: ...      [x] Patient's own products: organic toothpaste from [employer], Aquaphor, Eucerin cleanser, Amos lip    DO NOT test if chemical or biological identity is unknown!     always ask from patient the product information and safety sheets (MSDS)   [x] Atopy screen prick test (Atopic predisposition): ...    RESULTS & EVALUATION of PATCH TESTS    Patch test readings after     [x] 2 days, [] 3 days [x] 4 days, [] 5 days,    Jun 25, 2021 application of patch tests with results:    STANDARD Series        No Substance 2 days 4 days remarks   1 1 Aashish Mix [C] - -     2 Colophony - -     3  2-Mercaptobenzothiazole  - -      4 Methylisothiazolinone - -    5 5 Carba Mix - -     6 Thiuram Mix [A] - -     7 Bisphenol A Epoxy Resin - -     8 Q-Itva-Pfettjxzrvd-Formaldehyde Resin - -     9 Mercapto Mix [A] - -    10 10 Black Rubber Mix- PPD [B] - -     11 Potassium Dichromate  +  -     12 Balsam of [place] (Myroxylon Pereirae Resin) - -     13 Nickel Sulphate Hexahydrate - -     14 Mixed Dialkyl Thiourea - -    15 15 Paraben Mix [B] - -     16 Methyldibromo Glutaronitrile - -     17 Fragrance Mix - -     18 2-Bromo-2-Nitropropane-1,3-Diol (Bronopol) NA NA     19 Lyral - -    20 20 Tixocortol-21- Pivalate - -     21 Diazolidiyl Urea (Germall II) - -     22 Methyl Methacrylate NA NA     23 Cobalt (II) Chloride Hexahydrate - -     24 Fragrance Mix II  - -    25 25 Compositae Mix - -     26 Benzoyl Peroxide - -     27 Bacitracin - -     28 Formaldehyde - -     29 Methylchloroisothiazolinone / Methylisothiazolinone - -    30 30 Corticosteroid Mix - -     31 Sodium Lauryl Sulfate - -     32 Lanolin Alcohol - -     33 Turpentine - -     34 Cetylstearylalcohol - -    35 35 Chlorhexidine Dicluconate - -     36 Budenoside - -     37 Imidazolidinyl Urea  - -     38 Ethyl-2  Cyanoacrylate - -     39 Quaternium 15 (Dowicil 200) - -    40 40 Decyl Glucoside - -    PRESERVATIVES & ANTIMICROBIALS        No Substance 2 days 4 days remarks   41 1  1,2-Benzisothiazoline-3-One, Sodium Salt - -     2  1,3,5-Cy (2-Hydroxyethyl) - Hexahydrotriazine (Grotan BK) - -     3 7-Nwyzurppmdbes-2-Nitro-1, 3-Propanediol - -     4  3, 4, 4' - Triclocarban - -    45 5 4 - Chloro - 3 - Cresol - -     6 4 - Chloro - 4 - Xylenol (PCMX) - -     7 7-Ethylbicyclooxazolidine (Bioban [id number]) - -     8 Benzalkonium Chloride - -     9 Benzyl Alcohol - -    50 10 Cetalkonium Chloride - -     11 Cetylpyrimidine Chloride  - -     12 Chloroacetamide - -     13 DMDM Hydantoin - -     14 Glutaraldehyde - -    55 15 Triclosan - -     16 Glyoxal Trimeric Dihydrate - -     17 Iodopropynyl Butylcarbamate - -     18 Octylisothiazoline - -     19 Iodoform NA NA    60 20 (Nitrobutyl) Morpholine/(Ethylnitro-Trimethylene) Dimorpholine (Bioban P 1487) - -     21 Phenoxyethanol - -     22 Phenyl Salicylate - -     23 Povidone Iodine - +/++     24 Sodium Benzoate - -    65 25 Sodium Disulfite - -     26 Sorbic Acid - -     27 Thimerosal       28 Melamine Formaldehyde Resin       29 Ethylenediamine Dihydrochloride        Parabens      70 30 Butyl-P-Hydroxybenzoate - -     31 Ethyl-P-Hydroxybenzoate - -     32 Methyl-P-Hydroxybenzoate - -    73 33 Propyl-P-Hydroxybenzoate - -    EMULSIFIERS & ADDITIVES       No Substance 2 days 4 days remarks   74 1 Polyethylene Glycol-400 - -    75 2 Cocamidopropyl Betaine NA NA     3 Amerchol L101 - -     4 Propylene Glycol - -     5 Triethanolamine - -     6 Sorbitane Sesquiolate NA NA    80 7 Isopropylmyristate - -     8 Polysorbate 80  - -     9 Amidoamine   (Stearamidopropyl Dimethylamine) - -     10 Oleamidopropyl Dimethylamine NA NA     11 Lauryl Glucoside - -    85 12 Coconut Diethanolamide  - -     13 2-Hydroxy-4-Methoxy Benzophenone (Oxybenzone) - -     14 Benzophenone-4 (Sulisobenzon) - -      15 Propolis - -     16 Dexpanthenol - -    90 17 Carboxymethyl Cellulose Sodium NA NA     18 Abitol - -     19 Tert-Butylhydroquinone - -     20 Benzyl Salicylate - -      Antioxidant       21 Dodecyl Gallate - -    95 22 Butylhydroxyanisole (BHA) - -     23 Butylhydroxytoluene (BHT) - -     24 Di-Alpha-Tocopherol (Vit E) - -     25 Propyl Gallate - -     26 Zinc Pyrithione NA NA    100 27 Dimethylaminopropylamin (DMAPA) NA NA    COLORANTS, DYES, FOOD ADDITIVES   No Substance 2 days 4 days remarks   101 1 Aniline - -     2 Lloyd Brown R - -     3 Textile Dye Mix [A] - -     4 Bancroft  - -     5 Disperse Blue-3 - -     6 Disperse Orange-3 - -     7 Disperse Red-11 - -     8 Disperse Yellow-9 - -     9 Erythrosine-B - -    110 10 Naphthol AS - -       Food additives       11 Aspartame - -     12 Azorubine - -     13 Brilliant Black - -     14 Cochineal Red - -     15 Patent Blue-VF - -     16 Pectin - -     17 Quinoline Yellow - -     18 Saccharin - -     19 Tartrazine - -    120 20 Sodium Glutamate - -    PERFUMES, FLAVORS & PLANTS (added 06/22/2021)          No Substance 2 days 4 days remarks   121 1 Benzyl Cinnamate - -     2 Di-Limonene (Dipentene) - -     3 Cananga Odorata (Ruby Beltran) (I) - -     4 Lichen Acid Mix - -    125 5 Mentha Piperita Oil (Peppermint Oil) - -     6 Sesquiterpenelactone mix - -     7 Tea Tree Oil, Oxidized - -     8 Wood Tar Mix - -     9 Abietic Acid - -    130 10 Lavendula Angustifolia Oil (Lavender Oil) - -     11 Camphor  - -      Fragrance Mix I       12 Oakmoss Absolute - -     13 Eugenol - -     14 Geraniol - -    135 15 Hydroxycitronellal - -     16 Isoeugenol - -     17 Cinnamic Aldehyde - -     18 Cinnamic Alcohol  - -      Fragrance mix II       19 Citronellol - -    140 20 Alpha-Hexylcinnamic Aldehyde    - -     21 Citral - -     22 Farnesol - -    143 23 Coumarin - -        Results of patch tests:                         Interpretation:  - Negative                    A     = Allergic      (+) Erythema    TI   = Toxic/irritant   + E + Infiltration    RaP = Relevance at Present     ++ E/I + Papulovesicle   Rpr  = Relevance Previously     +++ E/I/P + Blister     nR   = No Relevance    Atopy Screen (Placed Jun 25, 2021)    No Substance Readings (15 min) Evaluation   POS Histamine 1mg/ml +    NEG NaCl 0.9% -      No Substance Readings (15 min) Evaluation   1 Alternaria alternata (tenuis)  -    2 Cladosporium herbarum -    3 Aspergillus fumigatus -    4 Penicillium notatum -    5 Dermatophagoides pteronyssinus -    6 Dermatophagoides farinae -    7 Dog epithelium (canis spp) -    8 Cat hair (alessandro catus) -    9 Cockroach   (Blatella americana & germanica) -    10 Grass mix midwest   (Yamileth, Orchard, Redtop, Tariq) -    11 Abhay grass (sorghum halepense) -    12 Weed mix   (common Cocklebur, Lamb s quarters, rough redroot Pigweed, Dock/Sorrel) -    13 Mug wort (artemisia vulgare) -    14 Ragweed giant/short (ambrosia spp) -    15 White birch (Betula papyrifera) -    16 Tree mix 1 (Pecan, Maple BHR, Oak RVW, american [place], black [place]) -    17 Red cedar (juniperus virginia) -    18 Tree mix 2   (white Alexander, river/red Birch, black [place], common Winkler, american Elm) -    19 Box elder/Maple mix (acer spp) -    20 [place] shagbark (carya ovata) -           Conclusion: no signs for atopy      Intradermal Testing (Placed Jun 25, 2021)    No Substance Conc.  Readings (15min) Evaluation   DF Standard Dust Mite - D. Farinae 1:10 +/++ 8mmP/10mmE   DP Standard Dust Mite - D. Pteronyssinus 1:10 (+) 6mmP/no E   A Aspergillus fumigatus  1:10 - neg   P Penicillium notatum 1:10 - neg   C Cat epithelium  1:10 - neg     1:10 -    Conclusion: slight immediate type reaction to the house dust mite D. Farinae, not to mold or cat. After 4 days intiltrated papule of about 6mm diameter over test site of Aspergillus fumigatus.   No sign for delayed reaction to mold Penicillium, cat and dust mites    [x]  Allergic reaction diagnosed against following allergens:  PVP Iodine +/++  Aspergillus fumigatus delayed reaction and D. Farinae dust mite immediate reaction      [x] Patient information given   [] ACDS CAMP information's (# ....) to following compounds: .....   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    ==> Treatment Plan:     Avoid in future use of PVP Iodine (put in allergy list)    Info given for molds and house dust mites and try to avoid    If recurrences of lip dermatitis, use Vaseline and if necessary Desonide. In future also Protopic or Elidel could be used    Try to use as lip balm only Vaseline    Referred by: Dr. Hidalgo [facility]      Staff: : provider    Follow-up in Derm-Allergy clinic if necessary  ___________________________    I spent a total of 30 minutes with Srinivasan Lee during today s  visit. This time was spent discussing all the individual test results, correlating them to the clinical relevance, counseling the patient and/or coordinating care and performing allergy tests or procedures.           Again, thank you for allowing me to participate in the care of your patient.        Sincerely,        Rashard Young MD

## 2021-10-10 ENCOUNTER — HEALTH MAINTENANCE LETTER (OUTPATIENT)
Age: 66
End: 2021-10-10

## 2022-01-29 ENCOUNTER — HEALTH MAINTENANCE LETTER (OUTPATIENT)
Age: 67
End: 2022-01-29

## 2022-09-18 ENCOUNTER — HEALTH MAINTENANCE LETTER (OUTPATIENT)
Age: 67
End: 2022-09-18

## 2023-05-07 ENCOUNTER — HEALTH MAINTENANCE LETTER (OUTPATIENT)
Age: 68
End: 2023-05-07

## 2024-07-14 ENCOUNTER — HEALTH MAINTENANCE LETTER (OUTPATIENT)
Age: 69
End: 2024-07-14